# Patient Record
Sex: FEMALE | Race: WHITE | NOT HISPANIC OR LATINO | Employment: UNEMPLOYED | RURAL
[De-identification: names, ages, dates, MRNs, and addresses within clinical notes are randomized per-mention and may not be internally consistent; named-entity substitution may affect disease eponyms.]

---

## 2022-10-14 ENCOUNTER — OFFICE VISIT (OUTPATIENT)
Dept: FAMILY MEDICINE | Facility: CLINIC | Age: 37
End: 2022-10-14
Payer: COMMERCIAL

## 2022-10-14 VITALS
SYSTOLIC BLOOD PRESSURE: 114 MMHG | BODY MASS INDEX: 19.8 KG/M2 | RESPIRATION RATE: 20 BRPM | DIASTOLIC BLOOD PRESSURE: 68 MMHG | WEIGHT: 123.19 LBS | HEIGHT: 66 IN | OXYGEN SATURATION: 98 % | HEART RATE: 83 BPM | TEMPERATURE: 98 F

## 2022-10-14 DIAGNOSIS — L02.411 CUTANEOUS ABSCESS OF RIGHT AXILLA: Primary | ICD-10-CM

## 2022-10-14 PROCEDURE — 99203 PR OFFICE/OUTPT VISIT, NEW, LEVL III, 30-44 MIN: ICD-10-PCS | Mod: 25,,, | Performed by: NURSE PRACTITIONER

## 2022-10-14 PROCEDURE — 1160F RVW MEDS BY RX/DR IN RCRD: CPT | Mod: ,,, | Performed by: NURSE PRACTITIONER

## 2022-10-14 PROCEDURE — 99203 OFFICE O/P NEW LOW 30 MIN: CPT | Mod: 25,,, | Performed by: NURSE PRACTITIONER

## 2022-10-14 PROCEDURE — 3078F PR MOST RECENT DIASTOLIC BLOOD PRESSURE < 80 MM HG: ICD-10-PCS | Mod: ,,, | Performed by: NURSE PRACTITIONER

## 2022-10-14 PROCEDURE — 96372 THER/PROPH/DIAG INJ SC/IM: CPT | Mod: ,,, | Performed by: NURSE PRACTITIONER

## 2022-10-14 PROCEDURE — 1159F PR MEDICATION LIST DOCUMENTED IN MEDICAL RECORD: ICD-10-PCS | Mod: ,,, | Performed by: NURSE PRACTITIONER

## 2022-10-14 PROCEDURE — 3078F DIAST BP <80 MM HG: CPT | Mod: ,,, | Performed by: NURSE PRACTITIONER

## 2022-10-14 PROCEDURE — 3074F SYST BP LT 130 MM HG: CPT | Mod: ,,, | Performed by: NURSE PRACTITIONER

## 2022-10-14 PROCEDURE — 3074F PR MOST RECENT SYSTOLIC BLOOD PRESSURE < 130 MM HG: ICD-10-PCS | Mod: ,,, | Performed by: NURSE PRACTITIONER

## 2022-10-14 PROCEDURE — 1160F PR REVIEW ALL MEDS BY PRESCRIBER/CLIN PHARMACIST DOCUMENTED: ICD-10-PCS | Mod: ,,, | Performed by: NURSE PRACTITIONER

## 2022-10-14 PROCEDURE — 96372 PR INJECTION,THERAP/PROPH/DIAG2ST, IM OR SUBCUT: ICD-10-PCS | Mod: ,,, | Performed by: NURSE PRACTITIONER

## 2022-10-14 PROCEDURE — 1159F MED LIST DOCD IN RCRD: CPT | Mod: ,,, | Performed by: NURSE PRACTITIONER

## 2022-10-14 RX ORDER — CLINDAMYCIN HYDROCHLORIDE 300 MG/1
300 CAPSULE ORAL EVERY 6 HOURS
Qty: 40 CAPSULE | Refills: 0 | Status: SHIPPED | OUTPATIENT
Start: 2022-10-14 | End: 2022-10-24

## 2022-10-14 RX ORDER — MUPIROCIN 20 MG/G
OINTMENT TOPICAL 3 TIMES DAILY
Qty: 30 G | Refills: 0 | Status: SHIPPED | OUTPATIENT
Start: 2022-10-14 | End: 2023-11-28

## 2022-10-14 RX ORDER — LINCOMYCIN HYDROCHLORIDE 300 MG/ML
600 INJECTION, SOLUTION INTRAMUSCULAR; INTRAVENOUS; SUBCONJUNCTIVAL
Status: COMPLETED | OUTPATIENT
Start: 2022-10-14 | End: 2022-10-14

## 2022-10-14 RX ADMIN — LINCOMYCIN HYDROCHLORIDE 600 MG: 300 INJECTION, SOLUTION INTRAMUSCULAR; INTRAVENOUS; SUBCONJUNCTIVAL at 10:10

## 2022-10-14 NOTE — PROGRESS NOTES
"Subjective:       Patient ID: Elisabeth Ford is a 37 y.o. female.    Chief Complaint: Cyst (Pt presents with knot to right axillary x 2 days. She states its very painful. She states that her breast is also "hard" near the knot. The pain radiates into her arm. )    Pt presents with a painful knot under right arm x 2 days.     Review of Systems   Constitutional:  Negative for activity change, appetite change, chills, fatigue and fever.   HENT:  Negative for nasal congestion, ear discharge, nosebleeds, postnasal drip, rhinorrhea, sinus pressure/congestion, sneezing, sore throat and tinnitus.    Eyes:  Negative for pain, discharge, redness and itching.   Respiratory:  Negative for cough, choking, chest tightness, shortness of breath and wheezing.    Cardiovascular:  Negative for chest pain.   Gastrointestinal:  Negative for abdominal distention, abdominal pain, blood in stool, change in bowel habit, constipation, diarrhea, nausea, vomiting and change in bowel habit.   Genitourinary:  Negative for decreased urine volume, dysuria, flank pain, frequency, menstrual irregularity and menstrual problem.        Last cycle 2 weeks ago   Musculoskeletal:  Negative for back pain and gait problem.   Integumentary:  Positive for mole/lesion. Negative for wound, breast mass and breast discharge.   Allergic/Immunologic: Negative for immunocompromised state.   Neurological:  Negative for dizziness, light-headedness and headaches.   Psychiatric/Behavioral:  Negative for agitation, behavioral problems and hallucinations.    Breast: Negative for mass      Objective:      Physical Exam  Vitals and nursing note reviewed.   Constitutional:       Appearance: Normal appearance.   Cardiovascular:      Rate and Rhythm: Normal rate and regular rhythm.      Heart sounds: Normal heart sounds.   Pulmonary:      Effort: Pulmonary effort is normal.      Breath sounds: Normal breath sounds.   Chest:       Musculoskeletal:         General: Normal range of " motion.   Neurological:      Mental Status: She is alert and oriented to person, place, and time.   Psychiatric:         Mood and Affect: Mood normal.         Behavior: Behavior normal.       Assessment:       Problem List Items Addressed This Visit    None  Visit Diagnoses       Cutaneous abscess of right axilla    -  Primary    Relevant Medications    lincomycin injection 600 mg (Start on 10/14/2022 11:00 AM)    clindamycin (CLEOCIN) 300 MG capsule    mupirocin (BACTROBAN) 2 % ointment            Plan:       Warm moist compresses to the area. Clean area with dial soap and water twice daily. Notify clinic if symptoms persist after 1 week and I will order a mammogram and ultrasound.

## 2023-11-28 ENCOUNTER — OFFICE VISIT (OUTPATIENT)
Dept: FAMILY MEDICINE | Facility: CLINIC | Age: 38
End: 2023-11-28
Payer: COMMERCIAL

## 2023-11-28 VITALS
DIASTOLIC BLOOD PRESSURE: 62 MMHG | SYSTOLIC BLOOD PRESSURE: 98 MMHG | TEMPERATURE: 99 F | RESPIRATION RATE: 20 BRPM | WEIGHT: 131 LBS | BODY MASS INDEX: 21.05 KG/M2 | OXYGEN SATURATION: 99 % | HEIGHT: 66 IN | HEART RATE: 72 BPM

## 2023-11-28 DIAGNOSIS — G25.81 RESTLESS LEG SYNDROME: Primary | ICD-10-CM

## 2023-11-28 DIAGNOSIS — F33.9 EPISODE OF RECURRENT MAJOR DEPRESSIVE DISORDER, UNSPECIFIED DEPRESSION EPISODE SEVERITY: ICD-10-CM

## 2023-11-28 DIAGNOSIS — N92.1 MENORRHAGIA WITH IRREGULAR CYCLE: ICD-10-CM

## 2023-11-28 DIAGNOSIS — N94.6 PAINFUL MENSTRUAL PERIODS: ICD-10-CM

## 2023-11-28 PROCEDURE — 3074F SYST BP LT 130 MM HG: CPT | Mod: ,,,

## 2023-11-28 PROCEDURE — 99213 PR OFFICE/OUTPT VISIT, EST, LEVL III, 20-29 MIN: ICD-10-PCS | Mod: ,,,

## 2023-11-28 PROCEDURE — 1159F MED LIST DOCD IN RCRD: CPT | Mod: ,,,

## 2023-11-28 PROCEDURE — 3074F PR MOST RECENT SYSTOLIC BLOOD PRESSURE < 130 MM HG: ICD-10-PCS | Mod: ,,,

## 2023-11-28 PROCEDURE — 3008F PR BODY MASS INDEX (BMI) DOCUMENTED: ICD-10-PCS | Mod: ,,,

## 2023-11-28 PROCEDURE — 3008F BODY MASS INDEX DOCD: CPT | Mod: ,,,

## 2023-11-28 PROCEDURE — 1159F PR MEDICATION LIST DOCUMENTED IN MEDICAL RECORD: ICD-10-PCS | Mod: ,,,

## 2023-11-28 PROCEDURE — 3078F PR MOST RECENT DIASTOLIC BLOOD PRESSURE < 80 MM HG: ICD-10-PCS | Mod: ,,,

## 2023-11-28 PROCEDURE — 3078F DIAST BP <80 MM HG: CPT | Mod: ,,,

## 2023-11-28 PROCEDURE — 1160F PR REVIEW ALL MEDS BY PRESCRIBER/CLIN PHARMACIST DOCUMENTED: ICD-10-PCS | Mod: ,,,

## 2023-11-28 PROCEDURE — 99213 OFFICE O/P EST LOW 20 MIN: CPT | Mod: ,,,

## 2023-11-28 PROCEDURE — 1160F RVW MEDS BY RX/DR IN RCRD: CPT | Mod: ,,,

## 2023-11-28 RX ORDER — POLYETHYLENE GLYCOL AND PROPYLENE GLYCOL 4; 3 MG/ML; MG/ML
SOLUTION/ DROPS OPHTHALMIC
COMMUNITY

## 2023-11-28 RX ORDER — CITALOPRAM 40 MG/1
40 TABLET, FILM COATED ORAL DAILY
COMMUNITY
Start: 2023-07-12 | End: 2023-12-12 | Stop reason: SDUPTHER

## 2023-11-28 RX ORDER — ROPINIROLE 0.5 MG/1
0.5 TABLET, FILM COATED ORAL 2 TIMES DAILY
Qty: 60 TABLET | Refills: 11 | Status: SHIPPED | OUTPATIENT
Start: 2023-11-28 | End: 2023-12-13

## 2023-11-28 NOTE — PROGRESS NOTES
"Subjective     Patient ID: Elisabeth Ford is a 38 y.o. female.    Chief Complaint: Medication Refill (Patient states she is having to take two pills to help)    TIFFANY is a 38 year old female that presents today for complaints of restless leg syndrome at night. She was started on ropinirole 0.25mg once nightly several months ago and states she was having to take 2 tablets for mild symptom relief. She is requesting to increase the medication. She reports not sleeping well at night, and feels more "on edge." She is on celexa 40mg daily. She admits to some depression that is worse on some days than others. Additionally, she is requesting a referral to gynecology for painful and heavy periods. She states her periods last approximately 3 weeks. She reports passing clots. She is unsure but thinks she had a tubal ligation at some time after the birth of a child.    Medication Refill  Associated symptoms include fatigue. Pertinent negatives include no abdominal pain, change in bowel habit, chest pain, chills, coughing, fever, headaches, joint swelling, myalgias, numbness or weakness.     Review of Systems   Constitutional:  Positive for fatigue. Negative for activity change, appetite change, chills and fever.   HENT:  Negative for ear pain, hearing loss, trouble swallowing and voice change.    Eyes:  Negative for visual disturbance.   Respiratory:  Negative for apnea, cough, chest tightness and shortness of breath.    Cardiovascular:  Negative for chest pain, palpitations and leg swelling.   Gastrointestinal:  Negative for abdominal pain, blood in stool, change in bowel habit and reflux.   Genitourinary:  Negative for bladder incontinence, difficulty urinating and flank pain.   Musculoskeletal:  Negative for back pain, gait problem, joint swelling and myalgias.        Restless legs     Integumentary:  Negative for color change and pallor.   Neurological:  Negative for dizziness, weakness, numbness and headaches. "   Psychiatric/Behavioral:  Negative for sleep disturbance. The patient is not nervous/anxious.           Objective     Physical Exam  Vitals and nursing note reviewed.   Constitutional:       Appearance: Normal appearance. She is normal weight.   HENT:      Head: Normocephalic.      Nose: Nose normal.      Mouth/Throat:      Mouth: Mucous membranes are moist.   Eyes:      Extraocular Movements: Extraocular movements intact.      Conjunctiva/sclera: Conjunctivae normal.      Pupils: Pupils are equal, round, and reactive to light.   Cardiovascular:      Rate and Rhythm: Normal rate and regular rhythm.      Pulses: Normal pulses.      Heart sounds: Normal heart sounds.   Pulmonary:      Effort: Pulmonary effort is normal.      Breath sounds: Normal breath sounds.   Abdominal:      General: Abdomen is flat. Bowel sounds are normal.      Palpations: Abdomen is soft.   Musculoskeletal:         General: Normal range of motion.      Cervical back: Normal range of motion and neck supple.   Skin:     General: Skin is warm and dry.   Neurological:      General: No focal deficit present.      Mental Status: She is alert and oriented to person, place, and time.   Psychiatric:         Behavior: Behavior normal.         Thought Content: Thought content normal.        Assessment and Plan     1. Restless leg syndrome  -     rOPINIRole (REQUIP) 0.5 MG tablet; Take 1 tablet (0.5 mg total) by mouth 2 (two) times a day.  Dispense: 60 tablet; Refill: 11    2. Episode of recurrent major depressive disorder, unspecified depression episode severity    3. Menorrhagia with irregular cycle  -     Ambulatory referral/consult to Obstetrics / Gynecology; Future; Expected date: 12/05/2023    4. Painful menstrual periods  -     Ambulatory referral/consult to Obstetrics / Gynecology; Future; Expected date: 12/05/2023        Increase ropinirole, lifestyle changes recommended  Recommend follow up with Raman MOON for elevated depression score in  clinic  Referral to BRISEIDA Tejeda for painful and heavy periods  F/U in 3 months         No follow-ups on file.

## 2023-12-12 ENCOUNTER — OFFICE VISIT (OUTPATIENT)
Dept: BEHAVIORAL HEALTH | Facility: CLINIC | Age: 38
End: 2023-12-12
Payer: COMMERCIAL

## 2023-12-12 VITALS
OXYGEN SATURATION: 98 % | HEART RATE: 91 BPM | BODY MASS INDEX: 20.89 KG/M2 | SYSTOLIC BLOOD PRESSURE: 130 MMHG | WEIGHT: 130 LBS | RESPIRATION RATE: 20 BRPM | TEMPERATURE: 98 F | HEIGHT: 66 IN | DIASTOLIC BLOOD PRESSURE: 70 MMHG

## 2023-12-12 DIAGNOSIS — F33.1 MODERATE EPISODE OF RECURRENT MAJOR DEPRESSIVE DISORDER: Primary | ICD-10-CM

## 2023-12-12 DIAGNOSIS — F51.02 ADJUSTMENT INSOMNIA: ICD-10-CM

## 2023-12-12 DIAGNOSIS — G25.81 RESTLESS LEGS SYNDROME: ICD-10-CM

## 2023-12-12 PROBLEM — F33.9 EPISODE OF RECURRENT MAJOR DEPRESSIVE DISORDER: Status: ACTIVE | Noted: 2023-12-12

## 2023-12-12 PROCEDURE — 3078F DIAST BP <80 MM HG: CPT | Mod: ,,, | Performed by: NURSE PRACTITIONER

## 2023-12-12 PROCEDURE — 90792 PR PSYCHIATRIC DIAGNOSTIC EVALUATION W/MEDICAL SERVICES: ICD-10-PCS | Mod: ,,, | Performed by: NURSE PRACTITIONER

## 2023-12-12 PROCEDURE — 3075F SYST BP GE 130 - 139MM HG: CPT | Mod: ,,, | Performed by: NURSE PRACTITIONER

## 2023-12-12 PROCEDURE — 1159F MED LIST DOCD IN RCRD: CPT | Mod: ,,, | Performed by: NURSE PRACTITIONER

## 2023-12-12 PROCEDURE — 1160F PR REVIEW ALL MEDS BY PRESCRIBER/CLIN PHARMACIST DOCUMENTED: ICD-10-PCS | Mod: ,,, | Performed by: NURSE PRACTITIONER

## 2023-12-12 PROCEDURE — 90792 PSYCH DIAG EVAL W/MED SRVCS: CPT | Mod: ,,, | Performed by: NURSE PRACTITIONER

## 2023-12-12 PROCEDURE — 1160F RVW MEDS BY RX/DR IN RCRD: CPT | Mod: ,,, | Performed by: NURSE PRACTITIONER

## 2023-12-12 PROCEDURE — 3075F PR MOST RECENT SYSTOLIC BLOOD PRESS GE 130-139MM HG: ICD-10-PCS | Mod: ,,, | Performed by: NURSE PRACTITIONER

## 2023-12-12 PROCEDURE — 1159F PR MEDICATION LIST DOCUMENTED IN MEDICAL RECORD: ICD-10-PCS | Mod: ,,, | Performed by: NURSE PRACTITIONER

## 2023-12-12 PROCEDURE — 3078F PR MOST RECENT DIASTOLIC BLOOD PRESSURE < 80 MM HG: ICD-10-PCS | Mod: ,,, | Performed by: NURSE PRACTITIONER

## 2023-12-12 RX ORDER — BUPROPION HYDROCHLORIDE 150 MG/1
150 TABLET ORAL DAILY
Qty: 90 TABLET | Refills: 1 | Status: SHIPPED | OUTPATIENT
Start: 2023-12-12 | End: 2024-06-09

## 2023-12-12 RX ORDER — CLORAZEPATE DIPOTASSIUM 7.5 MG/1
7.5 TABLET ORAL 2 TIMES DAILY PRN
Qty: 60 TABLET | Refills: 0 | Status: SHIPPED | OUTPATIENT
Start: 2023-12-12 | End: 2024-01-11

## 2023-12-12 RX ORDER — PRAMIPEXOLE DIHYDROCHLORIDE 0.12 MG/1
0.12 TABLET ORAL 3 TIMES DAILY
Qty: 90 TABLET | Refills: 2 | Status: SHIPPED | OUTPATIENT
Start: 2023-12-12 | End: 2024-03-11

## 2023-12-12 RX ORDER — CITALOPRAM 40 MG/1
40 TABLET, FILM COATED ORAL DAILY
Qty: 90 TABLET | Refills: 1 | Status: SHIPPED | OUTPATIENT
Start: 2023-12-12 | End: 2024-06-09
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
## 2023-12-13 PROBLEM — F51.02 ADJUSTMENT INSOMNIA: Status: ACTIVE | Noted: 2023-12-13

## 2023-12-13 PROBLEM — G25.81 RESTLESS LEGS SYNDROME: Status: ACTIVE | Noted: 2023-12-13

## 2023-12-19 RX ORDER — METHYLPREDNISOLONE 4 MG/1
TABLET ORAL
Qty: 21 EACH | Refills: 0 | Status: SHIPPED | OUTPATIENT
Start: 2023-12-19 | End: 2024-01-09

## 2023-12-19 RX ORDER — CETIRIZINE HYDROCHLORIDE, PSEUDOEPHEDRINE HYDROCHLORIDE 5; 120 MG/1; MG/1
1 TABLET, FILM COATED, EXTENDED RELEASE ORAL 2 TIMES DAILY PRN
Qty: 20 TABLET | Refills: 0 | Status: SHIPPED | OUTPATIENT
Start: 2023-12-19 | End: 2023-12-29

## 2024-04-29 ENCOUNTER — OFFICE VISIT (OUTPATIENT)
Dept: BEHAVIORAL HEALTH | Facility: CLINIC | Age: 39
End: 2024-04-29
Payer: COMMERCIAL

## 2024-04-29 VITALS
OXYGEN SATURATION: 98 % | SYSTOLIC BLOOD PRESSURE: 110 MMHG | HEART RATE: 86 BPM | WEIGHT: 135 LBS | RESPIRATION RATE: 20 BRPM | BODY MASS INDEX: 21.69 KG/M2 | HEIGHT: 66 IN | TEMPERATURE: 99 F | DIASTOLIC BLOOD PRESSURE: 70 MMHG

## 2024-04-29 DIAGNOSIS — F33.1 MODERATE EPISODE OF RECURRENT MAJOR DEPRESSIVE DISORDER: Primary | ICD-10-CM

## 2024-04-29 DIAGNOSIS — F41.9 ANXIETY: ICD-10-CM

## 2024-04-29 DIAGNOSIS — N92.6 ABNORMAL MENSES: ICD-10-CM

## 2024-04-29 DIAGNOSIS — F51.02 ADJUSTMENT INSOMNIA: ICD-10-CM

## 2024-04-29 LAB

## 2024-04-29 PROCEDURE — 99214 OFFICE O/P EST MOD 30 MIN: CPT | Mod: ,,, | Performed by: NURSE PRACTITIONER

## 2024-04-29 PROCEDURE — 3008F BODY MASS INDEX DOCD: CPT | Mod: CPTII,,, | Performed by: NURSE PRACTITIONER

## 2024-04-29 PROCEDURE — 90833 PSYTX W PT W E/M 30 MIN: CPT | Mod: ,,, | Performed by: NURSE PRACTITIONER

## 2024-04-29 PROCEDURE — 3074F SYST BP LT 130 MM HG: CPT | Mod: CPTII,,, | Performed by: NURSE PRACTITIONER

## 2024-04-29 PROCEDURE — 80305 DRUG TEST PRSMV DIR OPT OBS: CPT | Mod: QW,,, | Performed by: NURSE PRACTITIONER

## 2024-04-29 PROCEDURE — 3078F DIAST BP <80 MM HG: CPT | Mod: CPTII,,, | Performed by: NURSE PRACTITIONER

## 2024-04-29 PROCEDURE — 1159F MED LIST DOCD IN RCRD: CPT | Mod: CPTII,,, | Performed by: NURSE PRACTITIONER

## 2024-04-29 PROCEDURE — 1160F RVW MEDS BY RX/DR IN RCRD: CPT | Mod: CPTII,,, | Performed by: NURSE PRACTITIONER

## 2024-04-29 RX ORDER — ROPINIROLE 0.5 MG/1
0.5 TABLET, FILM COATED ORAL 2 TIMES DAILY
COMMUNITY

## 2024-04-29 RX ORDER — BUPROPION HYDROCHLORIDE 150 MG/1
150 TABLET ORAL DAILY
Qty: 90 TABLET | Refills: 3 | Status: SHIPPED | OUTPATIENT
Start: 2024-04-29 | End: 2025-04-24

## 2024-04-29 RX ORDER — CLORAZEPATE DIPOTASSIUM 7.5 MG/1
7.5 TABLET ORAL 2 TIMES DAILY PRN
Qty: 60 TABLET | Refills: 1 | Status: SHIPPED | OUTPATIENT
Start: 2024-04-29 | End: 2024-06-28

## 2024-04-29 RX ORDER — TRAZODONE HYDROCHLORIDE 100 MG/1
100 TABLET ORAL NIGHTLY PRN
Qty: 90 TABLET | Refills: 0 | Status: SHIPPED | OUTPATIENT
Start: 2024-04-29 | End: 2024-07-28

## 2024-04-29 RX ORDER — CITALOPRAM 40 MG/1
40 TABLET, FILM COATED ORAL DAILY
Qty: 90 TABLET | Refills: 3 | Status: SHIPPED | OUTPATIENT
Start: 2024-04-29 | End: 2025-04-29

## 2024-04-29 NOTE — PROGRESS NOTES
Outpatient Psychiatry Follow-Up Visit (MD/NP)    4/29/2024    Clinical Status of Patient:  Outpatient (Ambulatory)    Chief Complaint:  Elisabeth Ford is a 38 y.o. female who presents today for follow-up of depression, anxiety, adjustment problems, and relational problem.  Met with patient.      Interim Events/Subjective Report/Content of Current Session: Having difficulty sleeping at night. Feels constant depression and anxiety related to the death of her pregnant daughter last year. Is having repeated marital strain due to her 's history of alleged infidelity and his new status of being unemployed. She recently lost custody of her granddaughter to the child's biological father. She is very upset about this as the baby has been with her for the past year. 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 Has had 2-3 panic attacks per month. Feels like depression would improve if things in her life would settle down. Father in law is also having issues with health problems and given that he lives in close proximity to them, this is a concern of hers. Missed a paycheck due to an error in accounting which has caused her significant stress.    Psychotherapy:  Target symptoms: recurrent depression, anxiety , adjustment, work stress  Why chosen therapy is appropriate versus another modality: relevant to diagnosis, patient responds to this modality  Outcome monitoring methods: self-report, observation, checklist/rating scale  Therapeutic intervention type: supportive psychotherapy  Topics discussed/themes: relationships difficulties, work stress, parenting issues, difficulty managing affect in interpersonal relationships, building skills sets for symptom management, symptom recognition  The patient's response to the intervention is accepting. The patient's progress toward treatment goals is good.   Duration of intervention: 23 minutes.    Patient  reviewed this visit.     Review  of Systems   PSYCHIATRIC: Pertinant items are noted in the narrative.  CONSTITUTIONAL: No weight gain or loss.   RESPIRATORY: No shortness of breath.  CARDIOVASCULAR: No tachycardia or chest pain.    Past Medical, Family and Social History: The patient's past medical, family and social history have been reviewed and updated as appropriate within the electronic medical record - see encounter notes.    Compliance: yes    Side effects: None    Risk Parameters:  Patient reports no suicidal ideation  Patient reports no homicidal ideation  Patient reports no self-injurious behavior  Patient reports no violent behavior    Exam (detailed: at least 9 elements; comprehensive: all 15 elements)         4/29/2024    11:34 AM   Adult ADHD Self-Report Scale   How often do you have trouble wrapping up the final details of a project once the chanllenging parts have been done? 2   How often do you have difficulty getting things in order when you have to do a task that requires organization? 2   How often do you have problems remembering appointments or obligations? 3   When you have a task that requires a lot of thought, how often do you avoid or delay getting started? 2   How often do you fidget or squirm with your hands or feet when you have to sit down for a long time? 4   How often do you feel overly active and compelled to do things, like you were driven by a motor? 2   Part A Score 15   How often do you make careless mistakes when you have to work on a boring or difficult project? 1   How often do you have difficulty keeping your attention when you are doing boring or repetitive work? 2   How often do you have difficulty concentrating on what people say to you, even when they are speaking to you directly? 4   How often do you misplace or have difficulty finding things at home or at work? 2   How often are you distracted by activity or noise around you? 2   How often do you leave your seat in meetings or other situations in  which you are expected to remain seated? 1   How often do you feel restless or fidgety? 4   How often do you have difficulty unwinding and relaxing when you have time to yourself? 3   How often do you find yourself talking too much when you are in social situations? 2   When you're in a conversation, how often do you find yourself finishing the sentences of the people you are talking to before they can finish them themselves? 2   How often do you have difficulty waiting your turn in situations when turn taking is required? 1   How often do you interrupt others when they are busy? 1   Part B Score 25         4/29/2024    11:30 AM   MDQ Scale   you felt so good or so hyper that other people thought you were not your normal self or you were so hyper that you got into trouble? 0   you were so irritable that you shouted at people or started fights or arguments? 1   you felt much more self-confident than usual? 0   you got much less sleep than usual and found that you didn't really miss it? 1   you were more talkative or spoke much faster than usual? 1   thoughts raced through your head or you couldn't slow your mind down? 1   you were so easily distracted by things around you that you had trouble concentrating or staying on track? 1   you had more energy than usual? 0   you were much more active or did many more things than usual? 0   you were much more social or outgoing than usual, for example, you telephoned friends in the middle of the night? 0   you were much more interested in sex than usual? 0   you did things that were unusual for you or that other people might have thought were excessive, foolish, or risky? 0   spending money got you or your family in trouble? 0   If you checked YES to more than one of the above, have several of these ever happened during the same period of time? 0   How much of a problem did any of these cause you - like being unable to work; having family, money or legal troubles; getting into  "arguments or fights? Minor problem   Mood Disorder Questionnaire Score  5     Constitutional  Vitals:  Most recent vital signs, dated greater than 90 days prior to this appointment, were reviewed.   Vitals:    04/29/24 1120   BP: 110/70   Pulse: 86   Resp: 20   Temp: 98.6 °F (37 °C)   SpO2: 98%   Weight: 61.2 kg (135 lb)   Height: 5' 6" (1.676 m)        General:  age appropriate, well nourished, older than stated age, casually dressed, neatly groomed     Musculoskeletal  Muscle Strength/Tone:  no spasicity, no rigidity, no cogwheeling, no flaccidity, no paratonia, no dyskinesia, no dystonia   Gait & Station:  non-ataxic     Psychiatric  Speech:  no latency; no press   Mood & Affect:  anxious, depressed  congruent and appropriate   Thought Process:  normal and logical   Associations:  intact   Thought Content:  normal, no suicidality, no homicidality, delusions, or paranoia   Insight:  intact, has awareness of illness   Judgement: behavior is adequate to circumstances   Orientation:  grossly intact   Memory: intact for content of interview   Language: grossly intact   Attention Span & Concentration:  able to focus   Fund of Knowledge:  intact and appropriate to age and level of education, familiar with aspects of current personal life     Assessment and Diagnosis   Status/Progress: Based on the examination today, the patient's problem(s) is/are improved and adequately but not ideally controlled.  New problems have not been presented today.   Co-morbidities, Diagnostic uncertainty, and Lack of compliance are not complicating management of the primary condition.  There are no active rule-out diagnoses for this patient at this time.     General Impression: Has had little improvement in symptoms and attributes this to worsening of personal life. Will begin Trazodone to help with night time insomnia, continue clorazepate and increase Wellbutrin to 300 mg XL daily. Would benefit from individual counseling. Passage of time " will help with depression and anxiety symptoms related to daughters passing, issues with  and granddaughter. Will rerefer to [facility] for routine GYN examination and E&M for abnormal menses (2 periods this month).      ICD-10-CM ICD-9-CM   1. Moderate episode of recurrent major depressive disorder  F33.1 296.32   2. Adjustment insomnia  F51.02 307.41   3. Anxiety  F41.9 300.00   4. Abnormal menses  N92.6 626.9       Intervention/Counseling/Treatment Plan   Medication Management: Continue current medications. The risks and benefits of medication were discussed with the patient. Verbalized understanding  Counseling provided with patient as follows: importance of compliance with chosen treatment options was emphasized, risks and benefits of treatment options, including medications, were discussed with the patient, risk factor reduction, prognosis, patient education, instructions for  management, treatment, and follow-up were reviewed  POCT UDS presumptive collected and is appropriate.  Needs a referral back to OBGYN.    Medications:   Medication List with Changes/Refills   New Medications    CITALOPRAM (CELEXA) 40 MG TABLET    Take 1 tablet (40 mg total) by mouth once daily.       Start Date: 4/29/2024 End Date: 4/29/2025    TRAZODONE (DESYREL) 100 MG TABLET    Take 1 tablet (100 mg total) by mouth nightly as needed for Insomnia.       Start Date: 4/29/2024 End Date: 7/28/2024   Current Medications    CITALOPRAM (CELEXA) 40 MG TABLET    Take 1 tablet (40 mg total) by mouth once daily.       Start Date: 12/12/2023End Date: 6/9/2024    -PROPYLENE GLYCOL, PF, (SYSTANE, PF,) 0.4-0.3 % DPET    Apply to eye.       Start Date: --        End Date: --    PRAMIPEXOLE (MIRAPEX) 0.125 MG TABLET    Take 1 tablet (0.125 mg total) by mouth 3 (three) times daily.       Start Date: 12/12/2023End Date: 4/29/2024    ROPINIROLE (REQUIP) 0.5 MG TABLET    Take 0.5 mg by mouth 2 (two) times a day.       Start Date: --         End Date: --   Changed and/or Refilled Medications    Modified Medication Previous Medication    BUPROPION (WELLBUTRIN XL) 150 MG TB24 TABLET buPROPion (WELLBUTRIN XL) 150 MG TB24 tablet       Take 1 tablet (150 mg total) by mouth once daily.    Take 1 tablet (150 mg total) by mouth once daily.       Start Date: 4/29/2024 End Date: 4/24/2025    Start Date: 12/12/2023End Date: 4/29/2024    CLORAZEPATE (TRANXENE) 7.5 MG TAB clorazepate (TRANXENE) 7.5 MG Tab       Take 1 tablet (7.5 mg total) by mouth 2 (two) times daily as needed (anxiety).    Take 1 tablet (7.5 mg total) by mouth 2 (two) times daily as needed (anxiety).       Start Date: 4/29/2024 End Date: 6/28/2024    Start Date: 12/12/2023End Date: 4/29/2024     Return to Clinic: 3 months    Patient instructed to please go to emergency department if feeling as though you are going to harm to yourself or others or if you are in crisis; or to please call the clinic to report any worsening of symptoms or problems associated with medication.     Total Time: 51 minutes

## 2024-07-11 ENCOUNTER — TELEPHONE (OUTPATIENT)
Dept: OBSTETRICS AND GYNECOLOGY | Facility: CLINIC | Age: 39
End: 2024-07-11
Payer: COMMERCIAL

## 2024-07-11 NOTE — TELEPHONE ENCOUNTER
----- Message from Kimberly Quiroz sent at 7/9/2024 10:58 AM CDT -----  Who Called: Elisabeth Ford    Caller is requesting assistance/information from provider's office.          Preferred Method of Contact: Phone Call  Patient's Preferred Phone Number on File: [phone]   Best Call Back Number, if different:  Additional Information: pt calling to reschedule 7/8 appt she missed - schedule will not allow me to schedule    Patient was not available, unable to LVM, if patient calls back please assist in rescheduling next available.

## 2024-07-31 ENCOUNTER — OFFICE VISIT (OUTPATIENT)
Dept: OBSTETRICS AND GYNECOLOGY | Facility: CLINIC | Age: 39
End: 2024-07-31

## 2024-07-31 VITALS
OXYGEN SATURATION: 97 % | HEIGHT: 66 IN | DIASTOLIC BLOOD PRESSURE: 56 MMHG | SYSTOLIC BLOOD PRESSURE: 100 MMHG | RESPIRATION RATE: 18 BRPM | BODY MASS INDEX: 23.17 KG/M2 | HEART RATE: 87 BPM | WEIGHT: 144.19 LBS

## 2024-07-31 DIAGNOSIS — N92.1 MENORRHAGIA WITH IRREGULAR CYCLE: ICD-10-CM

## 2024-07-31 DIAGNOSIS — Z78.0 MENOPAUSE: ICD-10-CM

## 2024-07-31 DIAGNOSIS — N89.8 VAGINAL DISCHARGE: ICD-10-CM

## 2024-07-31 DIAGNOSIS — G89.29 CHRONIC RIGHT LOWER QUADRANT PAIN: ICD-10-CM

## 2024-07-31 DIAGNOSIS — N94.19 DYSPAREUNIA DUE TO MEDICAL CONDITION IN FEMALE: ICD-10-CM

## 2024-07-31 DIAGNOSIS — N76.0 BACTERIAL VAGINOSIS: ICD-10-CM

## 2024-07-31 DIAGNOSIS — B96.89 BACTERIAL VAGINOSIS: ICD-10-CM

## 2024-07-31 DIAGNOSIS — N92.6 IRREGULAR MENSES: Primary | ICD-10-CM

## 2024-07-31 DIAGNOSIS — R10.31 CHRONIC RIGHT LOWER QUADRANT PAIN: ICD-10-CM

## 2024-07-31 DIAGNOSIS — N94.6 DYSMENORRHEA: ICD-10-CM

## 2024-07-31 DIAGNOSIS — R10.2 PELVIC PAIN: ICD-10-CM

## 2024-07-31 LAB
CTP QC/QA: YES
FECAL OCCULT BLOOD, POC: NEGATIVE

## 2024-07-31 PROCEDURE — 99459 PELVIC EXAMINATION: CPT | Mod: PBBFAC | Performed by: OBSTETRICS & GYNECOLOGY

## 2024-07-31 PROCEDURE — 88142 CYTOPATH C/V THIN LAYER: CPT | Mod: TC,GCY | Performed by: OBSTETRICS & GYNECOLOGY

## 2024-07-31 PROCEDURE — 99214 OFFICE O/P EST MOD 30 MIN: CPT | Mod: S$PBB,,, | Performed by: OBSTETRICS & GYNECOLOGY

## 2024-07-31 PROCEDURE — 99999PBSHW PR PBB SHADOW TECHNICAL ONLY FILED TO HB: Mod: PBBFAC,,,

## 2024-07-31 PROCEDURE — 87660 TRICHOMONAS VAGIN DIR PROBE: CPT | Mod: ,,, | Performed by: CLINICAL MEDICAL LABORATORY

## 2024-07-31 PROCEDURE — 99215 OFFICE O/P EST HI 40 MIN: CPT | Mod: PBBFAC | Performed by: OBSTETRICS & GYNECOLOGY

## 2024-07-31 PROCEDURE — 99999 PR PBB SHADOW E&M-EST. PATIENT-LVL V: CPT | Mod: PBBFAC,,, | Performed by: OBSTETRICS & GYNECOLOGY

## 2024-07-31 PROCEDURE — 87510 GARDNER VAG DNA DIR PROBE: CPT | Mod: ,,, | Performed by: CLINICAL MEDICAL LABORATORY

## 2024-07-31 PROCEDURE — 82270 OCCULT BLOOD FECES: CPT | Mod: PBBFAC | Performed by: OBSTETRICS & GYNECOLOGY

## 2024-07-31 PROCEDURE — 81001 URINALYSIS AUTO W/SCOPE: CPT | Mod: ,,, | Performed by: CLINICAL MEDICAL LABORATORY

## 2024-07-31 PROCEDURE — 99999PBSHW POCT OCCULT BLOOD STOOL: Mod: PBBFAC,,,

## 2024-07-31 PROCEDURE — 99459 PELVIC EXAMINATION: CPT | Mod: S$PBB,,, | Performed by: OBSTETRICS & GYNECOLOGY

## 2024-07-31 PROCEDURE — 87480 CANDIDA DNA DIR PROBE: CPT | Mod: ,,, | Performed by: CLINICAL MEDICAL LABORATORY

## 2024-07-31 NOTE — PATIENT INSTRUCTIONS
Dr. Mac Carranza thanks you for this office visit at [facility].    Diagnosis for this visit:   Problem List Items Addressed This Visit    None  Visit Diagnoses       Irregular menses    -  Primary    Relevant Orders    ThinPrep Pap Test    US Pelvis Complete Non OB    CBC Auto Differential    Comprehensive Metabolic Panel    Estradiol    Follicle Stimulating Hormone    Luteinizing Hormone    Sedimentation Rate    Thyroid Panel    Vitamin D    Urinalysis, Reflex to Urine Culture    Hemoglobin A1C    Menorrhagia with irregular cycle        Relevant Orders    ThinPrep Pap Test    US Pelvis Complete Non OB    CBC Auto Differential    Comprehensive Metabolic Panel    Estradiol    Follicle Stimulating Hormone    Luteinizing Hormone    Sedimentation Rate    Thyroid Panel    Vitamin D    Urinalysis, Reflex to Urine Culture    Hemoglobin A1C    Dysmenorrhea        Relevant Orders    ThinPrep Pap Test    US Pelvis Complete Non OB    CBC Auto Differential    Comprehensive Metabolic Panel    Estradiol    Follicle Stimulating Hormone    Luteinizing Hormone    Sedimentation Rate    Thyroid Panel    Vitamin D    Urinalysis, Reflex to Urine Culture    Hemoglobin A1C    Dyspareunia due to medical condition in female        Relevant Orders    ThinPrep Pap Test    US Pelvis Complete Non OB    CBC Auto Differential    Comprehensive Metabolic Panel    Estradiol    Follicle Stimulating Hormone    Luteinizing Hormone    Sedimentation Rate    Thyroid Panel    Vitamin D    Urinalysis, Reflex to Urine Culture    Hemoglobin A1C    Chronic right lower quadrant pain        Relevant Orders    ThinPrep Pap Test    US Pelvis Complete Non OB    CBC Auto Differential    Comprehensive Metabolic Panel    Estradiol    Follicle Stimulating Hormone    Luteinizing Hormone    Sedimentation Rate    Thyroid Panel    Vitamin D    Urinalysis, Reflex to Urine Culture    Hemoglobin A1C    Pelvic pain        Relevant Orders    ThinPrep Pap Test    US  Pelvis Complete Non OB    CBC Auto Differential    Comprehensive Metabolic Panel    Estradiol    Follicle Stimulating Hormone    Luteinizing Hormone    Sedimentation Rate    Thyroid Panel    Vitamin D    Urinalysis, Reflex to Urine Culture    Hemoglobin A1C    Vaginal discharge        Relevant Orders    ThinPrep Pap Test    US Pelvis Complete Non OB    CBC Auto Differential    Comprehensive Metabolic Panel    Estradiol    Follicle Stimulating Hormone    Luteinizing Hormone    Sedimentation Rate    Thyroid Panel    Vitamin D    Urinalysis, Reflex to Urine Culture    Hemoglobin A1C    Bacterial Vaginosis    Menopause        ?    Relevant Orders    ThinPrep Pap Test    US Pelvis Complete Non OB    CBC Auto Differential    Comprehensive Metabolic Panel    Estradiol    Follicle Stimulating Hormone    Luteinizing Hormone    Sedimentation Rate    Thyroid Panel    Vitamin D    Urinalysis, Reflex to Urine Culture    Hemoglobin A1C             The Plan:  Thin prep Pap; affirm study; screening labs; sed rate; urinalysis reflex urine; gonadotropins and estradiol level             Schedule for pelvic ultrasound             Doctor Tere recommends vitamin D3 over-the-counter at 4000 units daily. -she currently is on no vitamin-D supplementation.              Follow-up in 3-4 weeks        Please practice best food and exercise habits for your age.    Dr. Mac Carranza recommends avoidance of smoking and illicit medications or habits.    Please call  or schedule for any change in your health.     Please keep the next scheduled appointment or call for any need to change the appointment.     Dr. Mac Carranza recommends yearly exams for good health maintenance.      Thank you    Dr. Mac Carranza  07/31/2024 5:04 PM

## 2024-07-31 NOTE — PROGRESS NOTES
Elisabeth Ford female  for   Chief Complaint   Patient presents with    irregular cycles      Cycles can go for 1 week or sometimes 2 weeks, with right side pain that gets in the way of her daily actitives. Previously 5 cysts have been found on her ovaries the pain sometimes radiates down her leg.     Annual Exam     Patient is also here for an annual exam         OB History    No obstetric history on file.          HPI:  Patient was new to my practice.  She is 39 years of age  5, para 4 with 1 ectopic pregnancy on the right side.  Patient was been chronically complaining of heavy irregular menstrual cycles with the associated increasing painful periods -dysmenorrhea.  She complains of dyspareunia with referred pain to the right lower quadrant.    Patient is requesting hysterectomy resolve her chronic problems.    Her last menstrual period was 2024.  Menses was 4 days.  Patient wears tampons for control.  Patient reports flooding through tampons.  She can not wear perineal pads due to allergy the perineal pads.    She does complain of some intermittent hot flash symptomatology.    Patient believes she had laparoscopy for the ectopic pregnancy on the right.  Patient believes procedure was performed by Dr. Elias Hernandez at [facility].  Subsequently to the ectopic pregnancy resection, she did have 2 additional children.  All 4 children were delivered by  section.    Patient reports that Dr. Elias Hernandez delivered all her children.  Patient's youngest child is 17 years of age and the patient has had subsequent to her last  section no contraception.  Patient believes that Dr. Hernandez did not perform a tubal sterilization with the last  section.    Patient denies any galactorrhea.  She was complain of mastodynia.  Patient has never had a mammogram.  Patient has a negative history of breast carcinoma in the family.     History reviewed. No pertinent past medical history.   Past  "Surgical History:   Procedure Laterality Date     SECTION        Review of patient's allergies indicates:   Allergen Reactions    Phenergan [promethazine] Swelling        Review of Systems:Pertinent items are noted in HPI.     Physical exam:This gyn related exam was chaperoned by a female medical assistant.      BP (!) 100/56 (BP Location: Right arm, Patient Position: Sitting)   Pulse 87   Resp 18   Ht 5' 6" (1.676 m)   Wt 65.4 kg (144 lb 3.2 oz)   SpO2 97%   BMI 23.27 kg/m²      General Appearance: healthy, alert, smiling, slender  female with a BMI of 23.27    HEENT: Normal exam    Lymphatic: no palpable lymphadenopathy, no hepatosplenomegaly    Chest:           Breasts:No dimpling, nipple retraction or discharge. No masses or nodes., Normal to inspection, Normal breast tissue bilaterally, bilateral fibrocystic changes, and tender breasts bilaterally in the upper outer quadrants; patient has no obvious supraclavicular lymphadenopathy or axillary lymphadenopathy.           Lungs:clear to auscultation bilaterally           Heart: regular rate and rhythm, S1, S2 normal, no murmur, click, rub or gallop    Abdomen: soft, non-tender, without masses or organomegaly, normal bowel sounds, without guarding, without rebound, and multiple transverse Pfannenstiel incisions that are well healed on the lower abdomen with no evidence of ventral incisional hernia; patient has no abdominal bruit.  Slight tenderness to deep palpation right lower quadrant.    Pelvic:                    Vulva: normal appearing vulva with no masses, tenderness or lesions, shaved                    Cervix: normal appearing cervix without discharge or lesions, nulliparous os                    Uterus: uterus is normal size, shape, consistency and nontender, anteverted, mobile, moderately tender examination                    Annexa(e): normal adnexa in size, nontender and no masses                   Rectal: normal rectal, no " masses, guaiac negative stool obtained.     Extremity: normal, extremities warm, no clubbing, no cyanosis, no edema, non-tender    Skin: normal exam    Psych and neurologic exam: WNL                Assessment:   Problem List Items Addressed This Visit    None  Visit Diagnoses       Irregular menses    -  Primary    Relevant Orders    ThinPrep Pap Test    US Pelvis Complete Non OB    CBC Auto Differential    Comprehensive Metabolic Panel    Estradiol    Follicle Stimulating Hormone    Luteinizing Hormone    Sedimentation Rate    Thyroid Panel    Vitamin D    Urinalysis, Reflex to Urine Culture (Completed)    Hemoglobin A1C    POCT occult blood stool (Completed)    Urinalysis, Microscopic (Completed)    Menorrhagia with irregular cycle        Relevant Orders    ThinPrep Pap Test    US Pelvis Complete Non OB    CBC Auto Differential    Comprehensive Metabolic Panel    Estradiol    Follicle Stimulating Hormone    Luteinizing Hormone    Sedimentation Rate    Thyroid Panel    Vitamin D    Urinalysis, Reflex to Urine Culture (Completed)    Hemoglobin A1C    POCT occult blood stool (Completed)    Urinalysis, Microscopic (Completed)    Dysmenorrhea        Relevant Orders    ThinPrep Pap Test    US Pelvis Complete Non OB    CBC Auto Differential    Comprehensive Metabolic Panel    Estradiol    Follicle Stimulating Hormone    Luteinizing Hormone    Sedimentation Rate    Thyroid Panel    Vitamin D    Urinalysis, Reflex to Urine Culture (Completed)    Hemoglobin A1C    POCT occult blood stool (Completed)    Urinalysis, Microscopic (Completed)    Dyspareunia due to medical condition in female        Relevant Orders    ThinPrep Pap Test    US Pelvis Complete Non OB    CBC Auto Differential    Comprehensive Metabolic Panel    Estradiol    Follicle Stimulating Hormone    Luteinizing Hormone    Sedimentation Rate    Thyroid Panel    Vitamin D    Urinalysis, Reflex to Urine Culture (Completed)    Hemoglobin A1C    POCT occult blood  stool (Completed)    Urinalysis, Microscopic (Completed)    Chronic right lower quadrant pain        Relevant Orders    ThinPrep Pap Test    US Pelvis Complete Non OB    CBC Auto Differential    Comprehensive Metabolic Panel    Estradiol    Follicle Stimulating Hormone    Luteinizing Hormone    Sedimentation Rate    Thyroid Panel    Vitamin D    Urinalysis, Reflex to Urine Culture (Completed)    Hemoglobin A1C    POCT occult blood stool (Completed)    Urinalysis, Microscopic (Completed)    Pelvic pain        Relevant Orders    ThinPrep Pap Test    US Pelvis Complete Non OB    CBC Auto Differential    Comprehensive Metabolic Panel    Estradiol    Follicle Stimulating Hormone    Luteinizing Hormone    Sedimentation Rate    Thyroid Panel    Vitamin D    Urinalysis, Reflex to Urine Culture (Completed)    Hemoglobin A1C    POCT occult blood stool (Completed)    Urinalysis, Microscopic (Completed)    Vaginal discharge        Relevant Orders    ThinPrep Pap Test    US Pelvis Complete Non OB    CBC Auto Differential    Comprehensive Metabolic Panel    Estradiol    Follicle Stimulating Hormone    Luteinizing Hormone    Sedimentation Rate    Thyroid Panel    Vitamin D    Urinalysis, Reflex to Urine Culture (Completed)    Hemoglobin A1C    Bacterial Vaginosis (Completed)    POCT occult blood stool (Completed)    Urinalysis, Microscopic (Completed)    Menopause        ?    Relevant Orders    ThinPrep Pap Test    US Pelvis Complete Non OB    CBC Auto Differential    Comprehensive Metabolic Panel    Estradiol    Follicle Stimulating Hormone    Luteinizing Hormone    Sedimentation Rate    Thyroid Panel    Vitamin D    Urinalysis, Reflex to Urine Culture (Completed)    Hemoglobin A1C    POCT occult blood stool (Completed)    Urinalysis, Microscopic (Completed)    Bacterial vaginosis        Confirmed by affirm study             Plan:  Thin prep Pap; affirm study; screening labs; sed rate; urinalysis reflex urine; gonadotropins and  estradiol level             Schedule for pelvic ultrasound             Doctor Tere recommends vitamin D3 over-the-counter at 4000 units daily. -she currently is on no vitamin-D supplementation.              Follow-up in 3-4 weeks             Addendum on 08/02/2024:  Patient was found have bacterial vaginosis study.  The patient be placed on Flagyl therapy.  Component Ref Range & Units 2 d ago   Candida Species Negative, Invalid Negative   Gardnerella Negative, Invalid Positive Abnormal    Trichomonas Negative, Invalid Negative   Resulting Agency  [facility]

## 2024-08-01 LAB
BILIRUB UR QL STRIP: NEGATIVE
CANDIDA SPECIES: NEGATIVE
CLARITY UR: CLEAR
COLOR UR: NORMAL
GARDNERELLA: POSITIVE
GLUCOSE UR STRIP-MCNC: NORMAL MG/DL
KETONES UR STRIP-SCNC: NEGATIVE MG/DL
LEUKOCYTE ESTERASE UR QL STRIP: NEGATIVE
NITRITE UR QL STRIP: NEGATIVE
PH UR STRIP: 7 PH UNITS
PROT UR QL STRIP: NEGATIVE
RBC # UR STRIP: NEGATIVE /UL
RBC #/AREA URNS HPF: <1 /HPF
SP GR UR STRIP: 1.01
SQUAMOUS #/AREA URNS LPF: ABNORMAL /HPF
TRICHOMONAS: NEGATIVE
UROBILINOGEN UR STRIP-ACNC: NORMAL MG/DL
WBC #/AREA URNS HPF: 2 /HPF

## 2024-08-02 ENCOUNTER — PATIENT MESSAGE (OUTPATIENT)
Dept: OBSTETRICS AND GYNECOLOGY | Facility: CLINIC | Age: 39
End: 2024-08-02

## 2024-08-02 LAB
GH SERPL-MCNC: NORMAL NG/ML
INSULIN SERPL-ACNC: NORMAL U[IU]/ML
LAB AP CLINICAL INFORMATION: NORMAL
LAB AP GYN INTERPRETATION: NEGATIVE
LAB AP PAP DISCLAIMER COMMENTS: NORMAL
RENIN PLAS-CCNC: NORMAL NG/ML/H

## 2024-08-02 RX ORDER — METRONIDAZOLE 500 MG/1
500 TABLET ORAL 3 TIMES DAILY
Qty: 42 TABLET | Refills: 0 | Status: SHIPPED | OUTPATIENT
Start: 2024-08-02 | End: 2024-08-16

## 2024-08-16 DIAGNOSIS — F33.1 MODERATE EPISODE OF RECURRENT MAJOR DEPRESSIVE DISORDER: ICD-10-CM

## 2024-08-19 RX ORDER — CITALOPRAM 40 MG/1
40 TABLET, FILM COATED ORAL DAILY
Qty: 90 TABLET | Refills: 3 | Status: SHIPPED | OUTPATIENT
Start: 2024-08-19 | End: 2025-08-19

## 2024-08-23 ENCOUNTER — HOSPITAL ENCOUNTER (OUTPATIENT)
Dept: RADIOLOGY | Facility: HOSPITAL | Age: 39
Discharge: HOME | End: 2024-08-23
Attending: OBSTETRICS & GYNECOLOGY

## 2024-08-23 DIAGNOSIS — N92.1 MENORRHAGIA WITH IRREGULAR CYCLE: ICD-10-CM

## 2024-08-23 DIAGNOSIS — N89.8 VAGINAL DISCHARGE: ICD-10-CM

## 2024-08-23 DIAGNOSIS — N94.6 DYSMENORRHEA: ICD-10-CM

## 2024-08-23 DIAGNOSIS — N94.19 DYSPAREUNIA DUE TO MEDICAL CONDITION IN FEMALE: ICD-10-CM

## 2024-08-23 DIAGNOSIS — N92.6 IRREGULAR MENSES: ICD-10-CM

## 2024-08-23 DIAGNOSIS — G89.29 CHRONIC RIGHT LOWER QUADRANT PAIN: ICD-10-CM

## 2024-08-23 DIAGNOSIS — R10.2 PELVIC PAIN: ICD-10-CM

## 2024-08-23 DIAGNOSIS — Z78.0 MENOPAUSE: ICD-10-CM

## 2024-08-23 DIAGNOSIS — R10.31 CHRONIC RIGHT LOWER QUADRANT PAIN: ICD-10-CM

## 2024-10-10 ENCOUNTER — TELEPHONE (OUTPATIENT)
Dept: OBSTETRICS AND GYNECOLOGY | Facility: CLINIC | Age: 39
End: 2024-10-10

## 2024-10-10 NOTE — TELEPHONE ENCOUNTER
----- Message from Nurse Chambers sent at 10/10/2024  3:02 PM CDT -----  Regarding: appt  You can call her and get her set up to see  or MIGDALIA Damon  ----- Message -----  From: Daniella Snell MA  Sent: 10/9/2024   3:04 PM CDT  To: [facility] Staff    Who Called: Elisabeth Ford    Caller is requesting assistance/information from provider's office.        Preferred Method of Contact: Phone Call  Patient's Preferred Phone Number on File: [phone]   Best Call Back Number, if different:  Additional Information: pt states at last visit that Dr. Carranza was supposed to set her up with a different dr for hysterectomy

## 2024-10-21 ENCOUNTER — TELEPHONE (OUTPATIENT)
Dept: OBSTETRICS AND GYNECOLOGY | Facility: CLINIC | Age: 39
End: 2024-10-21

## 2024-10-21 ENCOUNTER — PATIENT MESSAGE (OUTPATIENT)
Dept: OBSTETRICS AND GYNECOLOGY | Facility: CLINIC | Age: 39
End: 2024-10-21

## 2024-10-21 ENCOUNTER — OFFICE VISIT (OUTPATIENT)
Dept: OBSTETRICS AND GYNECOLOGY | Facility: CLINIC | Age: 39
End: 2024-10-21

## 2024-10-21 VITALS
HEART RATE: 77 BPM | HEIGHT: 66 IN | BODY MASS INDEX: 23.49 KG/M2 | OXYGEN SATURATION: 97 % | WEIGHT: 146.19 LBS | DIASTOLIC BLOOD PRESSURE: 59 MMHG | SYSTOLIC BLOOD PRESSURE: 112 MMHG

## 2024-10-21 DIAGNOSIS — N92.0 MENORRHAGIA WITH REGULAR CYCLE: ICD-10-CM

## 2024-10-21 DIAGNOSIS — R10.2 PELVIC PAIN: ICD-10-CM

## 2024-10-21 DIAGNOSIS — N89.8 VAGINAL DISCHARGE: ICD-10-CM

## 2024-10-21 DIAGNOSIS — N92.6 IRREGULAR MENSES: Primary | ICD-10-CM

## 2024-10-21 DIAGNOSIS — N94.6 DYSMENORRHEA: ICD-10-CM

## 2024-10-21 PROCEDURE — 87510 GARDNER VAG DNA DIR PROBE: CPT | Mod: ,,, | Performed by: CLINICAL MEDICAL LABORATORY

## 2024-10-21 PROCEDURE — 99214 OFFICE O/P EST MOD 30 MIN: CPT | Mod: PBBFAC | Performed by: OBSTETRICS & GYNECOLOGY

## 2024-10-21 PROCEDURE — 99999 PR PBB SHADOW E&M-EST. PATIENT-LVL IV: CPT | Mod: PBBFAC,,, | Performed by: OBSTETRICS & GYNECOLOGY

## 2024-10-21 PROCEDURE — 87480 CANDIDA DNA DIR PROBE: CPT | Mod: ,,, | Performed by: CLINICAL MEDICAL LABORATORY

## 2024-10-21 PROCEDURE — 87660 TRICHOMONAS VAGIN DIR PROBE: CPT | Mod: ,,, | Performed by: CLINICAL MEDICAL LABORATORY

## 2024-10-21 PROCEDURE — 99214 OFFICE O/P EST MOD 30 MIN: CPT | Mod: S$PBB,,, | Performed by: OBSTETRICS & GYNECOLOGY

## 2024-10-21 RX ORDER — HYDROCODONE BITARTRATE AND ACETAMINOPHEN 5; 325 MG/1; MG/1
1 TABLET ORAL EVERY 6 HOURS PRN
Qty: 18 TABLET | Refills: 0 | Status: SHIPPED | OUTPATIENT
Start: 2024-10-21

## 2024-10-21 RX ORDER — TRANEXAMIC ACID 650 MG/1
1300 TABLET ORAL 3 TIMES DAILY
Qty: 30 TABLET | Refills: 11 | Status: SHIPPED | OUTPATIENT
Start: 2024-10-21 | End: 2024-10-26

## 2024-10-21 RX ORDER — MISOPROSTOL 200 UG/1
400 TABLET ORAL ONCE
Qty: 2 TABLET | Refills: 0 | Status: SHIPPED | OUTPATIENT
Start: 2024-10-21 | End: 2024-11-06 | Stop reason: SDUPTHER

## 2024-10-21 NOTE — PROGRESS NOTES
Gynecology History and Physical    Assessment/Plan:   Problem List Items Addressed This Visit    None  Visit Diagnoses       Irregular menses    -  Primary    Dysmenorrhea        Relevant Medications    HYDROcodone-acetaminophen (NORCO) 5-325 mg per tablet    Other Relevant Orders    US Pelvis Complete Non OB    Vaginal discharge        Relevant Orders    Bacterial Vaginosis (Completed)    Menorrhagia with regular cycle        Relevant Orders    US Pelvis Complete Non OB    Endometrial biopsy          She desires definitive surgical management via a hysterectomy ASAP.   Due to having no US report, RTC in 2 weeks for pelvic US and return GYN visit with [facility]. She is up to date on her PAP.     To schedule TRH BS with cysto on 2024 at 0730.   Case discussed in detail and all questions answered.     Will also have pre-op visit and review and sign consents at that time.     Norco to take prior to and after the [facility].     Will call on BV swab results.    > 30 minutes spent on review of previous records, direct face to face counseling, and physical exam.     CC:   Chief Complaint   Patient presents with    Gynecologic Exam   Curret  HPI: Elisabeth Ford is a 39 y.o. female who presents with complaints of painful, irregular cycles that last 2 weeks with heavy bleeding. Currently c/o pain on left side of abdomen.      She first saw Dr. Carranza during the summer.   2024 PAP was negative.  She had an US done some time in August, but it was never read or signed.     This is Dr. Carranza's note from 2024:  She is 39 years of age  5, para 4 with 1 ectopic pregnancy on the right side.  Patient was been chronically complaining of heavy irregular menstrual cycles with the associated increasing painful periods -dysmenorrhea.  She complains of dyspareunia with referred pain to the right lower quadrant.     Patient is requesting hysterectomy resolve her chronic problems.     Her last menstrual period was 2024.  Menses  was 4 days.  Patient wears tampons for control.  Patient reports flooding through tampons.  She can not wear perineal pads due to allergy the perineal pads.     She does complain of some intermittent hot flash symptomatology.     Patient believes she had laparoscopy for the ectopic pregnancy on the right.  Patient believes procedure was performed by Dr. Elias Hernandez at [facility].  Subsequently to the ectopic pregnancy resection, she did have 2 additional children.  All 4 children were delivered by  section.     Patient reports that Dr. Elias Hernandez delivered all her children.  Patient's youngest child is 17 years of age and the patient has had subsequent to her last  section no contraception.  Patient believes that Dr. Hernandez did not perform a tubal sterilization with the last  section.     Patient denies any galactorrhea.  She was complain of mastodynia.  Patient has never had a mammogram.  Patient has a negative history of breast carcinoma in the family.    She had labs in August which showed BV which was treated. She had insufficiency of Vit D. Thyroid panel negative. CMP wnl, Estradiol wnl (187), FSH wnl (9), LH 17.2 (wnl), Sed rate wnl. HA1C 5.4. Slight anemia (H/H 11.4/36.8), UA wnl    Review of Systems: The following ROS was otherwise negative, except as noted in the HPI:  constitutional, HEENT, respiratory, cardiovascular, gastrointestinal, genitourinary, skin, musculoskeletal, neurological, psych    Gynecologic History:   Unsure of history of abnormal pap smears  No history of of STIs  Menstrual history:       Obstetrical History:  OB History          5    Para   4    Term   4            AB   1    Living   2         SAB        IAB        Ectopic   1    Multiple        Live Births   4           Obstetric Comments   1 ectopic pregnancy               Past Medical History:   History reviewed. No pertinent past medical history.    Medications:  Medication List with  "Changes/Refills   New Medications    CYCLOBENZAPRINE (FLEXERIL) 10 MG TABLET    Take 1 tablet (10 mg total) by mouth 3 (three) times daily as needed for Muscle spasms.    HYDROCODONE-ACETAMINOPHEN (NORCO) 5-325 MG PER TABLET    Take 1 tablet by mouth every 6 (six) hours as needed for Pain.    MISOPROSTOL (CYTOTEC) 200 MCG TAB    Take 2 tablets (400 mcg total) by mouth once. Take the night before the procedure. for 1 dose   Current Medications    BUPROPION (WELLBUTRIN XL) 150 MG TB24 TABLET    Take 1 tablet (150 mg total) by mouth once daily.    CITALOPRAM (CELEXA) 40 MG TABLET    Take 1 tablet (40 mg total) by mouth once daily.    CLORAZEPATE (TRANXENE) 7.5 MG TAB    Take 1 tablet (7.5 mg total) by mouth 2 (two) times daily as needed (anxiety).    -PROPYLENE GLYCOL, PF, (SYSTANE, PF,) 0.4-0.3 % DPET    Apply to eye.    PRAMIPEXOLE (MIRAPEX) 0.125 MG TABLET    Take 1 tablet (0.125 mg total) by mouth 3 (three) times daily.    ROPINIROLE (REQUIP) 0.5 MG TABLET    Take 0.5 mg by mouth 2 (two) times a day.    TRAZODONE (DESYREL) 100 MG TABLET    Take 1 tablet (100 mg total) by mouth nightly as needed for Insomnia.       Allergies:  Phenergan [promethazine]    Surgical History:  Past Surgical History:   Procedure Laterality Date     SECTION         Family History:  No family history on file.    Social History:  Social History     Substance and Sexual Activity   Alcohol Use Not Currently     Social History     Substance and Sexual Activity   Drug Use Never     Social History     Tobacco Use   Smoking Status Some Days    Types: Vaping with nicotine   Smokeless Tobacco Never       Physical Exam:  BP (!) 112/59   Pulse 77   Ht 5' 6" (1.676 m)   Wt 66.3 kg (146 lb 3.2 oz)   SpO2 97%   BMI 23.60 kg/m²     General: Alert, well appearing, no acute distress  Head: Normocephalic, atraumatic  Lungs: Unlabored respirations. Clear to auscultation bilaterally.  Cardiovascular: Regular rate and rhythm.   Abdomen: " Soft, nontender, nondistended   Pelvic:  Exam chaperoned by female assistant.   External: normal female genitalia, no masses or lesions   Vagina: moist, pink mucosa with rugae, thin white discharge, BV swab.  Cervix: no masses or lesions, nontender.   Uterus: approx 9 weeks, mobile, midline, tender  Adnexa: no masses or fullness, tender bilaterally mild  Rectovaginal: deferred  Extremities: No redness or tenderness  Skin: Well perfused, normal coloration and turgor, no lesions or rashes visualized  Neuro: Alert, oriented, normal speech, no focal deficits, moves extremities appropriately  Psych: Normal mood and behavior.    Labs:  Office Visit on 10/21/2024   Component Date Value    Candida Species 10/21/2024 Negative     Gardnerella 10/21/2024 Positive (A)     Trichomonas 10/21/2024 Negative        Alesk Quiroz MD

## 2024-10-22 LAB
CANDIDA SPECIES: NEGATIVE
GARDNERELLA: POSITIVE
TRICHOMONAS: NEGATIVE

## 2024-10-23 ENCOUNTER — PATIENT MESSAGE (OUTPATIENT)
Dept: OBSTETRICS AND GYNECOLOGY | Facility: CLINIC | Age: 39
End: 2024-10-23

## 2024-10-23 ENCOUNTER — TELEPHONE (OUTPATIENT)
Dept: OBSTETRICS AND GYNECOLOGY | Facility: CLINIC | Age: 39
End: 2024-10-23

## 2024-10-23 DIAGNOSIS — N76.0 BACTERIAL VAGINOSIS: Primary | ICD-10-CM

## 2024-10-23 DIAGNOSIS — G89.29 CHRONIC RIGHT LOWER QUADRANT PAIN: ICD-10-CM

## 2024-10-23 DIAGNOSIS — R10.2 PELVIC PAIN: ICD-10-CM

## 2024-10-23 DIAGNOSIS — B96.89 BACTERIAL VAGINOSIS: Primary | ICD-10-CM

## 2024-10-23 DIAGNOSIS — N94.6 DYSMENORRHEA: ICD-10-CM

## 2024-10-23 DIAGNOSIS — N92.1 MENORRHAGIA WITH IRREGULAR CYCLE: ICD-10-CM

## 2024-10-23 DIAGNOSIS — N94.19 DYSPAREUNIA DUE TO MEDICAL CONDITION IN FEMALE: ICD-10-CM

## 2024-10-23 DIAGNOSIS — N92.1 MENOMETRORRHAGIA: ICD-10-CM

## 2024-10-23 DIAGNOSIS — N92.6 IRREGULAR MENSES: Primary | ICD-10-CM

## 2024-10-23 DIAGNOSIS — R10.31 CHRONIC RIGHT LOWER QUADRANT PAIN: ICD-10-CM

## 2024-10-23 RX ORDER — METRONIDAZOLE 500 MG/1
500 TABLET ORAL EVERY 12 HOURS
Qty: 14 TABLET | Refills: 0 | Status: SHIPPED | OUTPATIENT
Start: 2024-10-23 | End: 2024-10-30

## 2024-10-23 RX ORDER — SODIUM CHLORIDE 9 MG/ML
INJECTION, SOLUTION INTRAVENOUS CONTINUOUS
OUTPATIENT
Start: 2024-10-23

## 2024-10-23 RX ORDER — FAMOTIDINE 20 MG/1
20 TABLET, FILM COATED ORAL
OUTPATIENT
Start: 2024-10-23

## 2024-10-23 RX ORDER — MUPIROCIN 20 MG/G
OINTMENT TOPICAL
OUTPATIENT
Start: 2024-10-23

## 2024-10-23 RX ORDER — PHENAZOPYRIDINE HYDROCHLORIDE 100 MG/1
200 TABLET, FILM COATED ORAL
OUTPATIENT
Start: 2024-10-23 | End: 2024-10-23

## 2024-10-23 RX ORDER — CEFAZOLIN SODIUM 2 G/50ML
2 SOLUTION INTRAVENOUS
OUTPATIENT
Start: 2024-10-23

## 2024-10-25 ENCOUNTER — PATIENT MESSAGE (OUTPATIENT)
Dept: OBSTETRICS AND GYNECOLOGY | Facility: CLINIC | Age: 39
End: 2024-10-25

## 2024-10-28 DIAGNOSIS — R10.2 PELVIC PAIN: Primary | ICD-10-CM

## 2024-10-28 RX ORDER — CYCLOBENZAPRINE HCL 10 MG
10 TABLET ORAL 3 TIMES DAILY PRN
Qty: 30 TABLET | Refills: 2 | Status: SHIPPED | OUTPATIENT
Start: 2024-10-28

## 2024-11-04 ENCOUNTER — PATIENT MESSAGE (OUTPATIENT)
Dept: OBSTETRICS AND GYNECOLOGY | Facility: CLINIC | Age: 39
End: 2024-11-04

## 2024-11-06 ENCOUNTER — PROCEDURE VISIT (OUTPATIENT)
Dept: OBSTETRICS AND GYNECOLOGY | Facility: CLINIC | Age: 39
End: 2024-11-06

## 2024-11-06 ENCOUNTER — HOSPITAL ENCOUNTER (OUTPATIENT)
Dept: RADIOLOGY | Facility: HOSPITAL | Age: 39
Discharge: HOME | End: 2024-11-06
Attending: OBSTETRICS & GYNECOLOGY

## 2024-11-06 VITALS
HEART RATE: 78 BPM | DIASTOLIC BLOOD PRESSURE: 79 MMHG | HEIGHT: 66 IN | SYSTOLIC BLOOD PRESSURE: 125 MMHG | BODY MASS INDEX: 23.59 KG/M2 | WEIGHT: 146.81 LBS | OXYGEN SATURATION: 96 %

## 2024-11-06 DIAGNOSIS — G89.18 PAIN ASSOCIATED WITH SURGICAL PROCEDURE: ICD-10-CM

## 2024-11-06 DIAGNOSIS — N92.6 IRREGULAR MENSES: ICD-10-CM

## 2024-11-06 DIAGNOSIS — Z01.818 PRE-OP TESTING: Primary | ICD-10-CM

## 2024-11-06 DIAGNOSIS — N92.0 MENORRHAGIA WITH REGULAR CYCLE: ICD-10-CM

## 2024-11-06 DIAGNOSIS — R10.2 PELVIC PAIN: ICD-10-CM

## 2024-11-06 DIAGNOSIS — N94.19 DYSPAREUNIA DUE TO MEDICAL CONDITION IN FEMALE: ICD-10-CM

## 2024-11-06 DIAGNOSIS — N94.6 DYSMENORRHEA: ICD-10-CM

## 2024-11-06 LAB
B-HCG UR QL: NEGATIVE
CTP QC/QA: YES

## 2024-11-06 PROCEDURE — 99999PBSHW POCT URINE PREGNANCY: Mod: PBBFAC,,,

## 2024-11-06 PROCEDURE — 96372 THER/PROPH/DIAG INJ SC/IM: CPT | Mod: PBBFAC | Performed by: OBSTETRICS & GYNECOLOGY

## 2024-11-06 PROCEDURE — 88305 TISSUE EXAM BY PATHOLOGIST: CPT | Mod: 26,,, | Performed by: PATHOLOGY

## 2024-11-06 PROCEDURE — 76856 US EXAM PELVIC COMPLETE: CPT | Mod: TC

## 2024-11-06 PROCEDURE — 99999PBSHW PR PBB SHADOW TECHNICAL ONLY FILED TO HB: Mod: PBBFAC,,,

## 2024-11-06 PROCEDURE — 76856 US EXAM PELVIC COMPLETE: CPT | Mod: 26,,, | Performed by: RADIOLOGY

## 2024-11-06 PROCEDURE — 58100 BIOPSY OF UTERUS LINING: CPT | Mod: PBBFAC | Performed by: OBSTETRICS & GYNECOLOGY

## 2024-11-06 PROCEDURE — 88305 TISSUE EXAM BY PATHOLOGIST: CPT | Mod: TC,SUR | Performed by: OBSTETRICS & GYNECOLOGY

## 2024-11-06 PROCEDURE — 76830 TRANSVAGINAL US NON-OB: CPT | Mod: 26,,, | Performed by: RADIOLOGY

## 2024-11-06 PROCEDURE — 81025 URINE PREGNANCY TEST: CPT | Mod: PBBFAC | Performed by: OBSTETRICS & GYNECOLOGY

## 2024-11-06 RX ORDER — KETOROLAC TROMETHAMINE 30 MG/ML
60 INJECTION, SOLUTION INTRAMUSCULAR; INTRAVENOUS
Status: COMPLETED | OUTPATIENT
Start: 2024-11-06 | End: 2024-11-06

## 2024-11-06 RX ORDER — MISOPROSTOL 200 UG/1
400 TABLET ORAL ONCE
Qty: 2 TABLET | Refills: 0 | Status: SHIPPED | OUTPATIENT
Start: 2024-11-06 | End: 2024-11-06

## 2024-11-06 RX ADMIN — KETOROLAC TROMETHAMINE 60 MG: 30 INJECTION, SOLUTION INTRAMUSCULAR; INTRAVENOUS at 04:11

## 2024-11-08 ENCOUNTER — HOSPITAL ENCOUNTER (EMERGENCY)
Facility: HOSPITAL | Age: 39
Discharge: HOME | End: 2024-11-09
Attending: EMERGENCY MEDICINE

## 2024-11-08 DIAGNOSIS — R10.2 PELVIC PAIN: Primary | ICD-10-CM

## 2024-11-08 DIAGNOSIS — R10.9 ABDOMINAL PAIN, UNSPECIFIED ABDOMINAL LOCATION: Primary | ICD-10-CM

## 2024-11-08 LAB
ALBUMIN SERPL BCP-MCNC: 3.2 G/DL (ref 3.5–5)
ALBUMIN/GLOB SERPL: 1 {RATIO}
ALP SERPL-CCNC: 71 U/L (ref 37–98)
ALT SERPL W P-5'-P-CCNC: 18 U/L (ref 13–56)
ANION GAP SERPL CALCULATED.3IONS-SCNC: 7 MMOL/L (ref 7–16)
AST SERPL W P-5'-P-CCNC: 20 U/L (ref 15–37)
B-HCG UR QL: NEGATIVE
BACTERIA #/AREA URNS HPF: ABNORMAL /HPF
BASOPHILS # BLD AUTO: 0.06 K/UL (ref 0–0.2)
BASOPHILS NFR BLD AUTO: 0.9 % (ref 0–1)
BILIRUB SERPL-MCNC: 0.4 MG/DL (ref ?–1.2)
BILIRUB UR QL STRIP: NEGATIVE
BUN SERPL-MCNC: 9 MG/DL (ref 7–18)
BUN/CREAT SERPL: 11 (ref 6–20)
CALCIUM SERPL-MCNC: 8.7 MG/DL (ref 8.5–10.1)
CHLORIDE SERPL-SCNC: 110 MMOL/L (ref 98–107)
CLARITY UR: CLEAR
CO2 SERPL-SCNC: 28 MMOL/L (ref 21–32)
COLOR UR: ABNORMAL
CREAT SERPL-MCNC: 0.8 MG/DL (ref 0.55–1.02)
CTP QC/QA: YES
DIFFERENTIAL METHOD BLD: ABNORMAL
EGFR (NO RACE VARIABLE) (RUSH/TITUS): 95 ML/MIN/1.73M2
EOSINOPHIL # BLD AUTO: 0.26 K/UL (ref 0–0.5)
EOSINOPHIL NFR BLD AUTO: 3.8 % (ref 1–4)
ERYTHROCYTE [DISTWIDTH] IN BLOOD BY AUTOMATED COUNT: 12.9 % (ref 11.5–14.5)
ESTROGEN SERPL-MCNC: NORMAL PG/ML
GLOBULIN SER-MCNC: 3.2 G/DL (ref 2–4)
GLUCOSE SERPL-MCNC: 103 MG/DL (ref 74–106)
GLUCOSE UR STRIP-MCNC: NORMAL MG/DL
HCT VFR BLD AUTO: 34.2 % (ref 38–47)
HGB BLD-MCNC: 10.8 G/DL (ref 12–16)
IMM GRANULOCYTES # BLD AUTO: 0.02 K/UL (ref 0–0.04)
IMM GRANULOCYTES NFR BLD: 0.3 % (ref 0–0.4)
INSULIN SERPL-ACNC: NORMAL U[IU]/ML
KETONES UR STRIP-SCNC: NEGATIVE MG/DL
LAB AP CLINICAL INFORMATION: NORMAL
LAB AP GROSS DESCRIPTION: NORMAL
LAB AP LABORATORY NOTES: NORMAL
LEUKOCYTE ESTERASE UR QL STRIP: NEGATIVE
LIPASE SERPL-CCNC: 21 U/L (ref 16–77)
LYMPHOCYTES # BLD AUTO: 2.12 K/UL (ref 1–4.8)
LYMPHOCYTES NFR BLD AUTO: 30.9 % (ref 27–41)
MCH RBC QN AUTO: 28.8 PG (ref 27–31)
MCHC RBC AUTO-ENTMCNC: 31.6 G/DL (ref 32–36)
MCV RBC AUTO: 91.2 FL (ref 80–96)
MONOCYTES # BLD AUTO: 0.56 K/UL (ref 0–0.8)
MONOCYTES NFR BLD AUTO: 8.2 % (ref 2–6)
MPC BLD CALC-MCNC: 11.1 FL (ref 9.4–12.4)
MUCOUS, UA: ABNORMAL /LPF
NEUTROPHILS # BLD AUTO: 3.85 K/UL (ref 1.8–7.7)
NEUTROPHILS NFR BLD AUTO: 55.9 % (ref 53–65)
NITRITE UR QL STRIP: POSITIVE
NRBC # BLD AUTO: 0 X10E3/UL
NRBC, AUTO (.00): 0 %
PH UR STRIP: 7.5 PH UNITS
PLATELET # BLD AUTO: 249 K/UL (ref 150–400)
POTASSIUM SERPL-SCNC: 3.8 MMOL/L (ref 3.5–5.1)
PROT SERPL-MCNC: 6.4 G/DL (ref 6.4–8.2)
PROT UR QL STRIP: 10
RBC # BLD AUTO: 3.75 M/UL (ref 4.2–5.4)
RBC # UR STRIP: NEGATIVE /UL
RBC #/AREA URNS HPF: 1 /HPF
SODIUM SERPL-SCNC: 141 MMOL/L (ref 136–145)
SP GR UR STRIP: 1.02
SQUAMOUS #/AREA URNS LPF: ABNORMAL /HPF
T3RU NFR SERPL: NORMAL %
UROBILINOGEN UR STRIP-ACNC: NORMAL MG/DL
WBC # BLD AUTO: 6.87 K/UL (ref 4.5–11)
WBC #/AREA URNS HPF: 5 /HPF

## 2024-11-08 PROCEDURE — 85025 COMPLETE CBC W/AUTO DIFF WBC: CPT | Performed by: EMERGENCY MEDICINE

## 2024-11-08 PROCEDURE — 81003 URINALYSIS AUTO W/O SCOPE: CPT | Performed by: EMERGENCY MEDICINE

## 2024-11-08 PROCEDURE — 96374 THER/PROPH/DIAG INJ IV PUSH: CPT

## 2024-11-08 PROCEDURE — 36415 COLL VENOUS BLD VENIPUNCTURE: CPT | Performed by: EMERGENCY MEDICINE

## 2024-11-08 PROCEDURE — 63600175 PHARM REV CODE 636 W HCPCS: Performed by: EMERGENCY MEDICINE

## 2024-11-08 PROCEDURE — 87186 SC STD MICRODIL/AGAR DIL: CPT | Performed by: EMERGENCY MEDICINE

## 2024-11-08 PROCEDURE — 99285 EMERGENCY DEPT VISIT HI MDM: CPT | Mod: 25

## 2024-11-08 PROCEDURE — 81025 URINE PREGNANCY TEST: CPT | Performed by: EMERGENCY MEDICINE

## 2024-11-08 PROCEDURE — 80053 COMPREHEN METABOLIC PANEL: CPT | Performed by: EMERGENCY MEDICINE

## 2024-11-08 PROCEDURE — 83690 ASSAY OF LIPASE: CPT | Performed by: EMERGENCY MEDICINE

## 2024-11-08 RX ORDER — IBUPROFEN 800 MG/1
800 TABLET ORAL EVERY 8 HOURS PRN
Qty: 30 TABLET | Refills: 1 | Status: SHIPPED | OUTPATIENT
Start: 2024-11-08

## 2024-11-08 RX ORDER — KETOROLAC TROMETHAMINE 15 MG/ML
15 INJECTION, SOLUTION INTRAMUSCULAR; INTRAVENOUS
Status: COMPLETED | OUTPATIENT
Start: 2024-11-08 | End: 2024-11-08

## 2024-11-08 RX ADMIN — KETOROLAC TROMETHAMINE 15 MG: 15 INJECTION, SOLUTION INTRAMUSCULAR; INTRAVENOUS at 10:11

## 2024-11-09 VITALS
DIASTOLIC BLOOD PRESSURE: 58 MMHG | TEMPERATURE: 98 F | OXYGEN SATURATION: 99 % | HEART RATE: 76 BPM | HEIGHT: 66 IN | WEIGHT: 148 LBS | RESPIRATION RATE: 18 BRPM | BODY MASS INDEX: 23.78 KG/M2 | SYSTOLIC BLOOD PRESSURE: 99 MMHG

## 2024-11-09 PROCEDURE — 63600175 PHARM REV CODE 636 W HCPCS: Performed by: EMERGENCY MEDICINE

## 2024-11-09 PROCEDURE — 96376 TX/PRO/DX INJ SAME DRUG ADON: CPT

## 2024-11-09 PROCEDURE — 25500020 PHARM REV CODE 255: Performed by: EMERGENCY MEDICINE

## 2024-11-09 PROCEDURE — 96375 TX/PRO/DX INJ NEW DRUG ADDON: CPT

## 2024-11-09 PROCEDURE — 25000003 PHARM REV CODE 250: Performed by: EMERGENCY MEDICINE

## 2024-11-09 RX ORDER — CYCLOBENZAPRINE HCL 10 MG
10 TABLET ORAL 3 TIMES DAILY PRN
Qty: 15 TABLET | Refills: 0 | Status: SHIPPED | OUTPATIENT
Start: 2024-11-09 | End: 2024-11-14

## 2024-11-09 RX ORDER — MORPHINE SULFATE 4 MG/ML
4 INJECTION, SOLUTION INTRAMUSCULAR; INTRAVENOUS
Status: COMPLETED | OUTPATIENT
Start: 2024-11-09 | End: 2024-11-09

## 2024-11-09 RX ORDER — OXYCODONE AND ACETAMINOPHEN 5; 325 MG/1; MG/1
1 TABLET ORAL
Status: COMPLETED | OUTPATIENT
Start: 2024-11-09 | End: 2024-11-09

## 2024-11-09 RX ORDER — ONDANSETRON HYDROCHLORIDE 2 MG/ML
4 INJECTION, SOLUTION INTRAVENOUS
Status: COMPLETED | OUTPATIENT
Start: 2024-11-09 | End: 2024-11-09

## 2024-11-09 RX ORDER — IOPAMIDOL 755 MG/ML
100 INJECTION, SOLUTION INTRAVASCULAR
Status: COMPLETED | OUTPATIENT
Start: 2024-11-09 | End: 2024-11-09

## 2024-11-09 RX ORDER — KETOROLAC TROMETHAMINE 15 MG/ML
15 INJECTION, SOLUTION INTRAMUSCULAR; INTRAVENOUS
Status: COMPLETED | OUTPATIENT
Start: 2024-11-09 | End: 2024-11-09

## 2024-11-09 RX ADMIN — IOPAMIDOL 100 ML: 755 INJECTION, SOLUTION INTRAVENOUS at 12:11

## 2024-11-09 RX ADMIN — ONDANSETRON 4 MG: 2 INJECTION INTRAMUSCULAR; INTRAVENOUS at 01:11

## 2024-11-09 RX ADMIN — MORPHINE SULFATE 4 MG: 4 INJECTION INTRAVENOUS at 01:11

## 2024-11-09 RX ADMIN — OXYCODONE HYDROCHLORIDE AND ACETAMINOPHEN 1 TABLET: 5; 325 TABLET ORAL at 02:11

## 2024-11-09 RX ADMIN — KETOROLAC TROMETHAMINE 15 MG: 15 INJECTION, SOLUTION INTRAMUSCULAR; INTRAVENOUS at 01:11

## 2024-11-09 NOTE — ED PROVIDER NOTES
Encounter Date: 2024    SCRIBE #1 NOTE: I, Ricky Lazcano, am scribing for, and in the presence of,  Steven Linares MD. I have scribed the entire note.       History     Chief Complaint   Patient presents with    Abdominal Pain     Pt c/o RLQ abd pain and pain is radiating to right leg. Pt states she had a bx of her uterus on . Pt was told by dr lubna richmond to come to the ER.      39 y.o.female presented to the ED for abdominal pain. She had a biopsy performed on 2024 for a fibroid on her uterus. She is now having right sided pain on her abdomen. She denies chills and thinks she may have had a fever last night. She has pain while urinating and she has to apply pressure to the right side. She is scheduled for a hysterectomy on 2024. She has HX of smoking and no medical HX on file.         The history is provided by the patient and the spouse. No  was used.     Review of patient's allergies indicates:   Allergen Reactions    Phenergan [promethazine] Swelling     History reviewed. No pertinent past medical history.  Past Surgical History:   Procedure Laterality Date     SECTION       No family history on file.  Social History     Tobacco Use    Smoking status: Some Days     Types: Vaping with nicotine    Smokeless tobacco: Never   Substance Use Topics    Alcohol use: Not Currently    Drug use: Never     Review of Systems   Gastrointestinal:  Positive for abdominal pain.   Genitourinary:         Pain while urinating    All other systems reviewed and are negative.      Physical Exam     Initial Vitals [248]   BP Pulse Resp Temp SpO2   121/74 84 18 98 °F (36.7 °C) 98 %      MAP       --         Physical Exam    Nursing note and vitals reviewed.  Constitutional: She appears well-developed and well-nourished.   HENT:   Head: Normocephalic and atraumatic.   Eyes: EOM are normal. Pupils are equal, round, and reactive to light.   Neck: Neck supple. No  thyromegaly present.   Normal range of motion.  Cardiovascular:  Normal rate, regular rhythm, normal heart sounds and intact distal pulses.           No murmur heard.  Pulmonary/Chest: Breath sounds normal. She has no wheezes.   Abdominal: Abdomen is soft. Bowel sounds are normal. There is abdominal tenderness (RIGHT SIDE).   Musculoskeletal:         General: No tenderness or edema. Normal range of motion.      Cervical back: Normal range of motion and neck supple.     Lymphadenopathy:     She has no cervical adenopathy.   Neurological: She is alert and oriented to person, place, and time. She has normal strength and normal reflexes. No cranial nerve deficit or sensory deficit. GCS score is 15. GCS eye subscore is 4. GCS verbal subscore is 5. GCS motor subscore is 6.   Skin: Skin is warm and dry. Capillary refill takes less than 2 seconds. No rash noted.   Psychiatric: She has a normal mood and affect.         ED Course   Procedures  Labs Reviewed   CULTURE, URINE - Abnormal       Result Value    Culture, Urine >100,000 Gram-negative Bacilli (*)    COMPREHENSIVE METABOLIC PANEL - Abnormal    Sodium 141      Potassium 3.8      Chloride 110 (*)     CO2 28      Anion Gap 7      Glucose 103      BUN 9      Creatinine 0.80      BUN/Creatinine Ratio 11      Calcium 8.7      Total Protein 6.4      Albumin 3.2 (*)     Globulin 3.2      A/G Ratio 1.0      Bilirubin, Total 0.4      Alk Phos 71      ALT 18      AST 20      eGFR 95     URINALYSIS, REFLEX TO URINE CULTURE - Abnormal    Color, UA Light-Yellow      Clarity, UA Clear      pH, UA 7.5      Leukocytes, UA Negative      Nitrites, UA Positive (*)     Protein, UA 10 (*)     Glucose, UA Normal      Ketones, UA Negative      Urobilinogen, UA Normal      Bilirubin, UA Negative      Blood, UA Negative      Specific Gravity, UA 1.020     CBC WITH DIFFERENTIAL - Abnormal    WBC 6.87      RBC 3.75 (*)     Hemoglobin 10.8 (*)     Hematocrit 34.2 (*)     MCV 91.2      MCH 28.8       MCHC 31.6 (*)     RDW 12.9      Platelet Count 249      MPV 11.1      Neutrophils % 55.9      Lymphocytes % 30.9      Monocytes % 8.2 (*)     Eosinophils % 3.8      Basophils % 0.9      Immature Granulocytes % 0.3      nRBC, Auto 0.0      Neutrophils, Abs 3.85      Lymphocytes, Absolute 2.12      Monocytes, Absolute 0.56      Eosinophils, Absolute 0.26      Basophils, Absolute 0.06      Immature Granulocytes, Absolute 0.02      nRBC, Absolute 0.00      Diff Type Auto     URINALYSIS, MICROSCOPIC - Abnormal    WBC, UA 5      RBC, UA 1      Bacteria, UA Moderate (*)     Squamous Epithelial Cells, UA Occasional (*)     Mucous Occasional (*)    LIPASE - Normal    Lipase 21     CBC W/ AUTO DIFFERENTIAL    Narrative:     The following orders were created for panel order CBC W/ AUTO DIFFERENTIAL.  Procedure                               Abnormality         Status                     ---------                               -----------         ------                     CBC with Differential[[id number]]       Abnormal            Final result                 Please view results for these tests on the individual orders.   POCT URINE PREGNANCY    POC Preg Test, Ur Negative       Acceptable Yes            Imaging Results              CT Abdomen Pelvis With IV Contrast NO Oral Contrast (Final result)  Result time 11/09/24 09:32:52      Final result by Violeta Garcia MD (11/09/24 09:32:52)                   Impression:      No acute abnormality within the abdomen or pelvis.    Indeterminate right renal lesion measuring 15 mm although most likely a cyst, strictly indeterminate.  Recommend non emergent follow-up with renal ultrasound.      Electronically signed by: Violeta Garcia  Date:    11/09/2024  Time:    09:32               Narrative:    EXAMINATION:  CT ABDOMEN PELVIS WITH IV CONTRAST    CLINICAL HISTORY:  Abdominal pain, acute, nonlocalized;    TECHNIQUE:  Low dose axial images, sagittal and coronal  reformations were obtained from the lung bases to the pubic symphysis following the IV administration of 100 mL of Isovue 370.  .  Oral contrast was not given.    COMPARISON:  Pelvic ultrasound 11/06/2024    FINDINGS:  Heart size is normal.  No pericardial effusion.  Lung bases are clear.    Liver is normal in morphology and with smooth contour.  No focal hepatic lesion.  Gallbladder is decompressed.  No radiopaque calculi.  No intra or extrahepatic biliary ductal dilatation.  The portal, splenic and superior mesenteric veins are patent.    The spleen, adrenal glands, and pancreas are normal.  Right renal 15 mm indeterminate lesion (series 3, image 30).    Abdominal aorta is normal in caliber.  No enlarged retroperitoneal lymph nodes.    No bowel obstruction or inflammatory change.  Normal appendix.  No free fluid or free air.    Bladder is smooth walled.  Normal uterus.  No adnexal mass.  No enlarged pelvic lymph nodes.    Soft tissues are unremarkable.  No acute or aggressive osseous abnormality..                                       Medications   ketorolac injection 15 mg (15 mg Intravenous Given 11/8/24 2211)   iopamidoL (ISOVUE-370) injection 100 mL (100 mLs Intravenous Given 11/9/24 0016)   ketorolac injection 15 mg (15 mg Intravenous Given 11/9/24 0116)   ondansetron injection 4 mg (4 mg Intravenous Given 11/9/24 0116)   morphine injection 4 mg (4 mg Intravenous Given 11/9/24 0116)   oxyCODONE-acetaminophen 5-325 mg per tablet 1 tablet (1 tablet Oral Given 11/9/24 0202)     Medical Decision Making            Attending Attestation:           Physician Attestation for Scribe:  Physician Attestation Statement for Scribe #1: I, Steven Linares MD, reviewed documentation, as scribed by Ricky Lazcano in my presence, and it is both accurate and complete.             ED Course as of 11/09/24 2234 Fri Nov 08, 2024 2146 MDM:  A 39-year-old female patient who had uterine biopsy 2 days ago; came to the  emergency department complaining of right upper quadrant abdominal pain and tenderness.  Also the patient complains of painful urination.  She mentioned that she has to push on her right flank to urinate.  Differential diagnosis right flank hematoma, kidney stone, ruptured ovarian cyst, acute cholecystitis. [HK]      ED Course User Index  [HK] Steven Linares MD                             Clinical Impression:  Final diagnoses:  [R10.9] Abdominal pain, unspecified abdominal location (Primary)          ED Disposition Condition    Discharge Stable          ED Prescriptions       Medication Sig Dispense Start Date End Date Auth. Provider    cyclobenzaprine (FLEXERIL) 10 MG tablet Take 1 tablet (10 mg total) by mouth 3 (three) times daily as needed for Muscle spasms. 15 tablet 11/9/2024 11/14/2024 Steven Linares MD          Follow-up Information    None          Steven Linares MD  11/09/24 6718

## 2024-11-10 ENCOUNTER — TELEPHONE (OUTPATIENT)
Dept: EMERGENCY MEDICINE | Facility: HOSPITAL | Age: 39
End: 2024-11-10

## 2024-11-10 LAB — UA COMPLETE W REFLEX CULTURE PNL UR: ABNORMAL

## 2024-11-10 RX ORDER — SULFAMETHOXAZOLE AND TRIMETHOPRIM 800; 160 MG/1; MG/1
1 TABLET ORAL 2 TIMES DAILY
Qty: 14 TABLET | Refills: 0 | Status: SHIPPED | OUTPATIENT
Start: 2024-11-10 | End: 2024-11-17

## 2024-11-10 NOTE — TELEPHONE ENCOUNTER
----- Message from RENETTA Royal sent at 11/10/2024  8:44 AM CST -----  Please have the patient follow-up with her primary care provider regarding the CT significant for right renal lesion measuring 15 mm although most likely a cyst, strictly indeterminate.  Recommend non emergent follow-up with renal ultrasound.

## 2024-11-10 NOTE — TELEPHONE ENCOUNTER
----- Message from RENETTA Royal sent at 11/10/2024  8:47 AM CST -----  Please notify the patient that I sent in a prescription for antibiotics

## 2024-11-11 ENCOUNTER — PATIENT MESSAGE (OUTPATIENT)
Dept: OBSTETRICS AND GYNECOLOGY | Facility: CLINIC | Age: 39
End: 2024-11-11

## 2024-11-11 DIAGNOSIS — N28.9 RENAL LESION: Primary | ICD-10-CM

## 2024-11-11 DIAGNOSIS — N93.9 ABNORMAL UTERINE BLEEDING: Primary | ICD-10-CM

## 2024-11-12 RX ORDER — NORETHINDRONE 5 MG/1
5 TABLET ORAL 2 TIMES DAILY
Qty: 60 TABLET | Refills: 0 | Status: SHIPPED | OUTPATIENT
Start: 2024-11-12 | End: 2024-12-12

## 2024-11-13 ENCOUNTER — PATIENT MESSAGE (OUTPATIENT)
Dept: OBSTETRICS AND GYNECOLOGY | Facility: CLINIC | Age: 39
End: 2024-11-13

## 2024-11-13 DIAGNOSIS — N28.9 RENAL LESION: Primary | ICD-10-CM

## 2024-12-04 ENCOUNTER — ANESTHESIA EVENT (OUTPATIENT)
Dept: SURGERY | Facility: HOSPITAL | Age: 39
End: 2024-12-04

## 2024-12-04 ENCOUNTER — HOSPITAL ENCOUNTER (INPATIENT)
Facility: HOSPITAL | Age: 39
LOS: 1 days | Discharge: HOME | DRG: 743 | End: 2024-12-05
Attending: OBSTETRICS & GYNECOLOGY | Admitting: OBSTETRICS & GYNECOLOGY

## 2024-12-04 ENCOUNTER — ANESTHESIA (OUTPATIENT)
Dept: SURGERY | Facility: HOSPITAL | Age: 39
End: 2024-12-04

## 2024-12-04 DIAGNOSIS — R10.31 CHRONIC RIGHT LOWER QUADRANT PAIN: ICD-10-CM

## 2024-12-04 DIAGNOSIS — N92.1 MENORRHAGIA WITH IRREGULAR CYCLE: ICD-10-CM

## 2024-12-04 DIAGNOSIS — N92.1 MENOMETRORRHAGIA: ICD-10-CM

## 2024-12-04 DIAGNOSIS — G89.29 CHRONIC RIGHT LOWER QUADRANT PAIN: ICD-10-CM

## 2024-12-04 DIAGNOSIS — Z48.89 ENCOUNTER FOR POSTOPERATIVE CARE: Primary | ICD-10-CM

## 2024-12-04 DIAGNOSIS — N94.19 DYSPAREUNIA DUE TO MEDICAL CONDITION IN FEMALE: ICD-10-CM

## 2024-12-04 DIAGNOSIS — N94.6 DYSMENORRHEA: ICD-10-CM

## 2024-12-04 DIAGNOSIS — Z01.818 PREOPERATIVE CLEARANCE: ICD-10-CM

## 2024-12-04 DIAGNOSIS — R10.2 PELVIC PAIN: ICD-10-CM

## 2024-12-04 PROBLEM — D25.1 INTRAMURAL LEIOMYOMA OF UTERUS: Status: ACTIVE | Noted: 2024-12-04

## 2024-12-04 LAB
ABORH RETYPE: NORMAL
ANION GAP SERPL CALCULATED.3IONS-SCNC: 8 MMOL/L (ref 7–16)
B-HCG UR QL: NEGATIVE
BASOPHILS # BLD AUTO: 0.06 K/UL (ref 0–0.2)
BASOPHILS NFR BLD AUTO: 1 % (ref 0–1)
BUN SERPL-MCNC: 17 MG/DL (ref 7–19)
BUN/CREAT SERPL: 23 (ref 6–20)
CALCIUM SERPL-MCNC: 8.6 MG/DL (ref 8.4–10.2)
CHLORIDE SERPL-SCNC: 111 MMOL/L (ref 98–107)
CO2 SERPL-SCNC: 24 MMOL/L (ref 22–29)
CREAT SERPL-MCNC: 0.73 MG/DL (ref 0.55–1.02)
CTP QC/QA: YES
DIFFERENTIAL METHOD BLD: ABNORMAL
EGFR (NO RACE VARIABLE) (RUSH/TITUS): 106 ML/MIN/1.73M2
EOSINOPHIL # BLD AUTO: 0.18 K/UL (ref 0–0.5)
EOSINOPHIL NFR BLD AUTO: 3.1 % (ref 1–4)
ERYTHROCYTE [DISTWIDTH] IN BLOOD BY AUTOMATED COUNT: 13.2 % (ref 11.5–14.5)
GLUCOSE SERPL-MCNC: 81 MG/DL (ref 74–100)
HCT VFR BLD AUTO: 31.8 % (ref 38–47)
HGB BLD-MCNC: 10.2 G/DL (ref 12–16)
IMM GRANULOCYTES # BLD AUTO: 0.01 K/UL (ref 0–0.04)
IMM GRANULOCYTES NFR BLD: 0.2 % (ref 0–0.4)
INDIRECT COOMBS: NORMAL
LYMPHOCYTES # BLD AUTO: 1.6 K/UL (ref 1–4.8)
LYMPHOCYTES NFR BLD AUTO: 27.7 % (ref 27–41)
MCH RBC QN AUTO: 28.8 PG (ref 27–31)
MCHC RBC AUTO-ENTMCNC: 32.1 G/DL (ref 32–36)
MCV RBC AUTO: 89.8 FL (ref 80–96)
MONOCYTES # BLD AUTO: 0.49 K/UL (ref 0–0.8)
MONOCYTES NFR BLD AUTO: 8.5 % (ref 2–6)
MPC BLD CALC-MCNC: 11 FL (ref 9.4–12.4)
NEUTROPHILS # BLD AUTO: 3.43 K/UL (ref 1.8–7.7)
NEUTROPHILS NFR BLD AUTO: 59.5 % (ref 53–65)
NRBC # BLD AUTO: 0 X10E3/UL
NRBC, AUTO (.00): 0 %
PLATELET # BLD AUTO: 255 K/UL (ref 150–400)
POTASSIUM SERPL-SCNC: 3.4 MMOL/L (ref 3.5–5.1)
RBC # BLD AUTO: 3.54 M/UL (ref 4.2–5.4)
RH BLD: NORMAL
SODIUM SERPL-SCNC: 140 MMOL/L (ref 136–145)
SPECIMEN OUTDATE: NORMAL
WBC # BLD AUTO: 5.77 K/UL (ref 4.5–11)

## 2024-12-04 PROCEDURE — 36415 COLL VENOUS BLD VENIPUNCTURE: CPT | Performed by: OBSTETRICS & GYNECOLOGY

## 2024-12-04 PROCEDURE — 36000713 HC OR TIME LEV V EA ADD 15 MIN: Performed by: OBSTETRICS & GYNECOLOGY

## 2024-12-04 PROCEDURE — 0UT74ZZ RESECTION OF BILATERAL FALLOPIAN TUBES, PERCUTANEOUS ENDOSCOPIC APPROACH: ICD-10-PCS | Performed by: OBSTETRICS & GYNECOLOGY

## 2024-12-04 PROCEDURE — 27000165 HC TUBE, ETT CUFFED: Performed by: NURSE ANESTHETIST, CERTIFIED REGISTERED

## 2024-12-04 PROCEDURE — 27000509 HC TUBE SALEM SUMP ANY SIZE: Performed by: NURSE ANESTHETIST, CERTIFIED REGISTERED

## 2024-12-04 PROCEDURE — 25000003 PHARM REV CODE 250: Performed by: NURSE ANESTHETIST, CERTIFIED REGISTERED

## 2024-12-04 PROCEDURE — C2628 CATHETER, OCCLUSION: HCPCS | Performed by: OBSTETRICS & GYNECOLOGY

## 2024-12-04 PROCEDURE — 81025 URINE PREGNANCY TEST: CPT | Performed by: OBSTETRICS & GYNECOLOGY

## 2024-12-04 PROCEDURE — 0UT94ZZ RESECTION OF UTERUS, PERCUTANEOUS ENDOSCOPIC APPROACH: ICD-10-PCS | Performed by: OBSTETRICS & GYNECOLOGY

## 2024-12-04 PROCEDURE — 25000003 PHARM REV CODE 250: Performed by: OBSTETRICS & GYNECOLOGY

## 2024-12-04 PROCEDURE — 11000001 HC ACUTE MED/SURG PRIVATE ROOM

## 2024-12-04 PROCEDURE — 63600175 PHARM REV CODE 636 W HCPCS: Performed by: ANESTHESIOLOGY

## 2024-12-04 PROCEDURE — 27000510 HC BLANKET BAIR HUGGER ANY SIZE: Performed by: NURSE ANESTHETIST, CERTIFIED REGISTERED

## 2024-12-04 PROCEDURE — 63600175 PHARM REV CODE 636 W HCPCS: Performed by: OBSTETRICS & GYNECOLOGY

## 2024-12-04 PROCEDURE — 99900035 HC TECH TIME PER 15 MIN (STAT)

## 2024-12-04 PROCEDURE — 27202344 HC EYESHIELD: Performed by: NURSE ANESTHETIST, CERTIFIED REGISTERED

## 2024-12-04 PROCEDURE — 27000689 HC BLADE LARYNGOSCOPE ANY SIZE: Performed by: NURSE ANESTHETIST, CERTIFIED REGISTERED

## 2024-12-04 PROCEDURE — 71000033 HC RECOVERY, INTIAL HOUR: Performed by: OBSTETRICS & GYNECOLOGY

## 2024-12-04 PROCEDURE — 80048 BASIC METABOLIC PNL TOTAL CA: CPT | Performed by: OBSTETRICS & GYNECOLOGY

## 2024-12-04 PROCEDURE — 63600175 PHARM REV CODE 636 W HCPCS: Mod: JZ,JG | Performed by: OBSTETRICS & GYNECOLOGY

## 2024-12-04 PROCEDURE — 37000008 HC ANESTHESIA 1ST 15 MINUTES: Performed by: OBSTETRICS & GYNECOLOGY

## 2024-12-04 PROCEDURE — 71000039 HC RECOVERY, EACH ADD'L HOUR: Performed by: OBSTETRICS & GYNECOLOGY

## 2024-12-04 PROCEDURE — 0DNW4ZZ RELEASE PERITONEUM, PERCUTANEOUS ENDOSCOPIC APPROACH: ICD-10-PCS | Performed by: OBSTETRICS & GYNECOLOGY

## 2024-12-04 PROCEDURE — 27000716 HC OXISENSOR PROBE, ANY SIZE: Performed by: NURSE ANESTHETIST, CERTIFIED REGISTERED

## 2024-12-04 PROCEDURE — 88307 TISSUE EXAM BY PATHOLOGIST: CPT | Mod: TC,SUR | Performed by: SURGERY

## 2024-12-04 PROCEDURE — 27200700 HC TUBE, ENDOTRACHEAL NASAL RAE: Performed by: NURSE ANESTHETIST, CERTIFIED REGISTERED

## 2024-12-04 PROCEDURE — 37000009 HC ANESTHESIA EA ADD 15 MINS: Performed by: OBSTETRICS & GYNECOLOGY

## 2024-12-04 PROCEDURE — 27201423 OPTIME MED/SURG SUP & DEVICES STERILE SUPPLY: Performed by: OBSTETRICS & GYNECOLOGY

## 2024-12-04 PROCEDURE — 36000712 HC OR TIME LEV V 1ST 15 MIN: Performed by: OBSTETRICS & GYNECOLOGY

## 2024-12-04 PROCEDURE — 94761 N-INVAS EAR/PLS OXIMETRY MLT: CPT

## 2024-12-04 PROCEDURE — 93010 ELECTROCARDIOGRAM REPORT: CPT | Mod: ,,, | Performed by: INTERNAL MEDICINE

## 2024-12-04 PROCEDURE — 88307 TISSUE EXAM BY PATHOLOGIST: CPT | Mod: 26,,, | Performed by: PATHOLOGY

## 2024-12-04 PROCEDURE — 36415 COLL VENOUS BLD VENIPUNCTURE: CPT | Mod: 91 | Performed by: OBSTETRICS & GYNECOLOGY

## 2024-12-04 PROCEDURE — 44180 LAP ENTEROLYSIS: CPT | Mod: ,,, | Performed by: SURGERY

## 2024-12-04 PROCEDURE — 27000655: Performed by: NURSE ANESTHETIST, CERTIFIED REGISTERED

## 2024-12-04 PROCEDURE — 63600175 PHARM REV CODE 636 W HCPCS: Performed by: NURSE ANESTHETIST, CERTIFIED REGISTERED

## 2024-12-04 PROCEDURE — 86900 BLOOD TYPING SEROLOGIC ABO: CPT | Performed by: OBSTETRICS & GYNECOLOGY

## 2024-12-04 PROCEDURE — 93005 ELECTROCARDIOGRAM TRACING: CPT

## 2024-12-04 PROCEDURE — 8E0W4CZ ROBOTIC ASSISTED PROCEDURE OF TRUNK REGION, PERCUTANEOUS ENDOSCOPIC APPROACH: ICD-10-PCS | Performed by: OBSTETRICS & GYNECOLOGY

## 2024-12-04 PROCEDURE — 0TJB8ZZ INSPECTION OF BLADDER, VIA NATURAL OR ARTIFICIAL OPENING ENDOSCOPIC: ICD-10-PCS | Performed by: OBSTETRICS & GYNECOLOGY

## 2024-12-04 PROCEDURE — 85025 COMPLETE CBC W/AUTO DIFF WBC: CPT | Performed by: OBSTETRICS & GYNECOLOGY

## 2024-12-04 RX ORDER — OXYCODONE AND ACETAMINOPHEN 5; 325 MG/1; MG/1
1 TABLET ORAL EVERY 4 HOURS PRN
Status: DISCONTINUED | OUTPATIENT
Start: 2024-12-04 | End: 2024-12-05 | Stop reason: HOSPADM

## 2024-12-04 RX ORDER — OXYCODONE AND ACETAMINOPHEN 10; 325 MG/1; MG/1
1 TABLET ORAL EVERY 4 HOURS PRN
Status: DISCONTINUED | OUTPATIENT
Start: 2024-12-04 | End: 2024-12-05 | Stop reason: HOSPADM

## 2024-12-04 RX ORDER — BUPROPION HYDROCHLORIDE 150 MG/1
150 TABLET ORAL DAILY
Status: DISCONTINUED | OUTPATIENT
Start: 2024-12-04 | End: 2024-12-05 | Stop reason: HOSPADM

## 2024-12-04 RX ORDER — PROPOFOL 10 MG/ML
VIAL (ML) INTRAVENOUS
Status: DISCONTINUED | OUTPATIENT
Start: 2024-12-04 | End: 2024-12-04

## 2024-12-04 RX ORDER — HYDROMORPHONE HYDROCHLORIDE 2 MG/ML
1 INJECTION, SOLUTION INTRAMUSCULAR; INTRAVENOUS; SUBCUTANEOUS EVERY 6 HOURS PRN
Status: DISCONTINUED | OUTPATIENT
Start: 2024-12-04 | End: 2024-12-05 | Stop reason: HOSPADM

## 2024-12-04 RX ORDER — IPRATROPIUM BROMIDE AND ALBUTEROL SULFATE 2.5; .5 MG/3ML; MG/3ML
3 SOLUTION RESPIRATORY (INHALATION) ONCE AS NEEDED
Status: DISCONTINUED | OUTPATIENT
Start: 2024-12-04 | End: 2024-12-04 | Stop reason: HOSPADM

## 2024-12-04 RX ORDER — ACETAMINOPHEN 325 MG/1
650 TABLET ORAL EVERY 4 HOURS PRN
Status: DISCONTINUED | OUTPATIENT
Start: 2024-12-04 | End: 2024-12-05 | Stop reason: HOSPADM

## 2024-12-04 RX ORDER — DIPHENHYDRAMINE HCL 25 MG
25 CAPSULE ORAL EVERY 4 HOURS PRN
Status: DISCONTINUED | OUTPATIENT
Start: 2024-12-04 | End: 2024-12-05 | Stop reason: HOSPADM

## 2024-12-04 RX ORDER — MEPERIDINE HYDROCHLORIDE 25 MG/ML
25 INJECTION INTRAMUSCULAR; INTRAVENOUS; SUBCUTANEOUS ONCE AS NEEDED
Status: COMPLETED | OUTPATIENT
Start: 2024-12-04 | End: 2024-12-04

## 2024-12-04 RX ORDER — DIPHENHYDRAMINE HYDROCHLORIDE 50 MG/ML
25 INJECTION INTRAMUSCULAR; INTRAVENOUS EVERY 6 HOURS PRN
Status: DISCONTINUED | OUTPATIENT
Start: 2024-12-04 | End: 2024-12-04 | Stop reason: HOSPADM

## 2024-12-04 RX ORDER — SIMETHICONE 80 MG
80 TABLET,CHEWABLE ORAL EVERY 4 HOURS PRN
Status: DISCONTINUED | OUTPATIENT
Start: 2024-12-04 | End: 2024-12-05 | Stop reason: HOSPADM

## 2024-12-04 RX ORDER — LIDOCAINE HYDROCHLORIDE 20 MG/ML
INJECTION, SOLUTION EPIDURAL; INFILTRATION; INTRACAUDAL; PERINEURAL
Status: DISCONTINUED | OUTPATIENT
Start: 2024-12-04 | End: 2024-12-04

## 2024-12-04 RX ORDER — HYDROMORPHONE HYDROCHLORIDE 2 MG/ML
INJECTION, SOLUTION INTRAMUSCULAR; INTRAVENOUS; SUBCUTANEOUS
Status: DISCONTINUED | OUTPATIENT
Start: 2024-12-04 | End: 2024-12-04

## 2024-12-04 RX ORDER — ONDANSETRON HYDROCHLORIDE 2 MG/ML
INJECTION, SOLUTION INTRAVENOUS
Status: DISCONTINUED | OUTPATIENT
Start: 2024-12-04 | End: 2024-12-04

## 2024-12-04 RX ORDER — SODIUM CHLORIDE 9 MG/ML
INJECTION, SOLUTION INTRAVENOUS CONTINUOUS
Status: DISCONTINUED | OUTPATIENT
Start: 2024-12-04 | End: 2024-12-04

## 2024-12-04 RX ORDER — ONDANSETRON HYDROCHLORIDE 2 MG/ML
4 INJECTION, SOLUTION INTRAVENOUS DAILY PRN
Status: DISCONTINUED | OUTPATIENT
Start: 2024-12-04 | End: 2024-12-04 | Stop reason: HOSPADM

## 2024-12-04 RX ORDER — DEXAMETHASONE SODIUM PHOSPHATE 4 MG/ML
INJECTION, SOLUTION INTRA-ARTICULAR; INTRALESIONAL; INTRAMUSCULAR; INTRAVENOUS; SOFT TISSUE
Status: DISCONTINUED | OUTPATIENT
Start: 2024-12-04 | End: 2024-12-04

## 2024-12-04 RX ORDER — KETOROLAC TROMETHAMINE 30 MG/ML
30 INJECTION, SOLUTION INTRAMUSCULAR; INTRAVENOUS EVERY 6 HOURS
Status: DISPENSED | OUTPATIENT
Start: 2024-12-04 | End: 2024-12-05

## 2024-12-04 RX ORDER — CEFAZOLIN 2 G/1
2 INJECTION, POWDER, FOR SOLUTION INTRAMUSCULAR; INTRAVENOUS
Status: COMPLETED | OUTPATIENT
Start: 2024-12-04 | End: 2024-12-04

## 2024-12-04 RX ORDER — KETOROLAC TROMETHAMINE 30 MG/ML
INJECTION, SOLUTION INTRAMUSCULAR; INTRAVENOUS
Status: DISCONTINUED | OUTPATIENT
Start: 2024-12-04 | End: 2024-12-04

## 2024-12-04 RX ORDER — PHENAZOPYRIDINE HYDROCHLORIDE 100 MG/1
200 TABLET, FILM COATED ORAL
Status: COMPLETED | OUTPATIENT
Start: 2024-12-04 | End: 2024-12-04

## 2024-12-04 RX ORDER — IBUPROFEN 400 MG/1
800 TABLET ORAL EVERY 8 HOURS
Status: DISCONTINUED | OUTPATIENT
Start: 2024-12-05 | End: 2024-12-05 | Stop reason: HOSPADM

## 2024-12-04 RX ORDER — GLUCAGON 1 MG
1 KIT INJECTION
Status: DISCONTINUED | OUTPATIENT
Start: 2024-12-04 | End: 2024-12-04 | Stop reason: HOSPADM

## 2024-12-04 RX ORDER — SODIUM CHLORIDE, SODIUM LACTATE, POTASSIUM CHLORIDE, CALCIUM CHLORIDE 600; 310; 30; 20 MG/100ML; MG/100ML; MG/100ML; MG/100ML
INJECTION, SOLUTION INTRAVENOUS CONTINUOUS
Status: DISCONTINUED | OUTPATIENT
Start: 2024-12-04 | End: 2024-12-05 | Stop reason: HOSPADM

## 2024-12-04 RX ORDER — MORPHINE SULFATE 10 MG/ML
4 INJECTION INTRAMUSCULAR; INTRAVENOUS; SUBCUTANEOUS EVERY 5 MIN PRN
Status: DISCONTINUED | OUTPATIENT
Start: 2024-12-04 | End: 2024-12-04 | Stop reason: HOSPADM

## 2024-12-04 RX ORDER — BUPIVACAINE HYDROCHLORIDE 2.5 MG/ML
INJECTION, SOLUTION EPIDURAL; INFILTRATION; INTRACAUDAL
Status: DISCONTINUED | OUTPATIENT
Start: 2024-12-04 | End: 2024-12-04 | Stop reason: HOSPADM

## 2024-12-04 RX ORDER — FAMOTIDINE 20 MG/1
20 TABLET, FILM COATED ORAL
Status: COMPLETED | OUTPATIENT
Start: 2024-12-04 | End: 2024-12-04

## 2024-12-04 RX ORDER — ROCURONIUM BROMIDE 10 MG/ML
INJECTION, SOLUTION INTRAVENOUS
Status: DISCONTINUED | OUTPATIENT
Start: 2024-12-04 | End: 2024-12-04

## 2024-12-04 RX ORDER — HYDROMORPHONE HYDROCHLORIDE 2 MG/ML
0.5 INJECTION, SOLUTION INTRAMUSCULAR; INTRAVENOUS; SUBCUTANEOUS EVERY 5 MIN PRN
Status: DISCONTINUED | OUTPATIENT
Start: 2024-12-04 | End: 2024-12-04 | Stop reason: HOSPADM

## 2024-12-04 RX ORDER — MIDAZOLAM HYDROCHLORIDE 1 MG/ML
INJECTION INTRAMUSCULAR; INTRAVENOUS
Status: DISCONTINUED | OUTPATIENT
Start: 2024-12-04 | End: 2024-12-04

## 2024-12-04 RX ORDER — PHENYLEPHRINE HYDROCHLORIDE 10 MG/ML
INJECTION INTRAVENOUS
Status: DISCONTINUED | OUTPATIENT
Start: 2024-12-04 | End: 2024-12-04

## 2024-12-04 RX ORDER — ONDANSETRON 4 MG/1
8 TABLET, ORALLY DISINTEGRATING ORAL EVERY 8 HOURS PRN
Status: DISCONTINUED | OUTPATIENT
Start: 2024-12-04 | End: 2024-12-05 | Stop reason: HOSPADM

## 2024-12-04 RX ORDER — FENTANYL CITRATE 50 UG/ML
INJECTION, SOLUTION INTRAMUSCULAR; INTRAVENOUS
Status: DISCONTINUED | OUTPATIENT
Start: 2024-12-04 | End: 2024-12-04

## 2024-12-04 RX ORDER — MUPIROCIN 20 MG/G
1 OINTMENT TOPICAL 2 TIMES DAILY
Status: DISCONTINUED | OUTPATIENT
Start: 2024-12-04 | End: 2024-12-05 | Stop reason: HOSPADM

## 2024-12-04 RX ORDER — CITALOPRAM 20 MG/1
40 TABLET, FILM COATED ORAL DAILY
Status: DISCONTINUED | OUTPATIENT
Start: 2024-12-04 | End: 2024-12-05 | Stop reason: HOSPADM

## 2024-12-04 RX ORDER — GLYCOPYRROLATE 0.2 MG/ML
INJECTION INTRAMUSCULAR; INTRAVENOUS
Status: DISCONTINUED | OUTPATIENT
Start: 2024-12-04 | End: 2024-12-04

## 2024-12-04 RX ORDER — MUPIROCIN 20 MG/G
OINTMENT TOPICAL
Status: DISCONTINUED | OUTPATIENT
Start: 2024-12-04 | End: 2024-12-04 | Stop reason: HOSPADM

## 2024-12-04 RX ADMIN — MORPHINE SULFATE 4 MG: 10 INJECTION INTRAVENOUS at 12:12

## 2024-12-04 RX ADMIN — ROCURONIUM BROMIDE 10 MG: 10 INJECTION, SOLUTION INTRAVENOUS at 09:12

## 2024-12-04 RX ADMIN — OXYCODONE AND ACETAMINOPHEN 1 TABLET: 10; 325 TABLET ORAL at 02:12

## 2024-12-04 RX ADMIN — SUGAMMADEX 50 MG: 100 INJECTION, SOLUTION INTRAVENOUS at 10:12

## 2024-12-04 RX ADMIN — HYDROMORPHONE HYDROCHLORIDE 0.5 MG: 2 INJECTION, SOLUTION INTRAMUSCULAR; INTRAVENOUS; SUBCUTANEOUS at 11:12

## 2024-12-04 RX ADMIN — KETOROLAC TROMETHAMINE 30 MG: 30 INJECTION, SOLUTION INTRAMUSCULAR; INTRAVENOUS at 05:12

## 2024-12-04 RX ADMIN — OXYCODONE AND ACETAMINOPHEN 1 TABLET: 10; 325 TABLET ORAL at 10:12

## 2024-12-04 RX ADMIN — PHENAZOPYRIDINE 200 MG: 100 TABLET ORAL at 06:12

## 2024-12-04 RX ADMIN — DEXAMETHASONE SODIUM PHOSPHATE 8 MG: 4 INJECTION, SOLUTION INTRA-ARTICULAR; INTRALESIONAL; INTRAMUSCULAR; INTRAVENOUS; SOFT TISSUE at 07:12

## 2024-12-04 RX ADMIN — ROCURONIUM BROMIDE 10 MG: 10 INJECTION, SOLUTION INTRAVENOUS at 08:12

## 2024-12-04 RX ADMIN — ONDANSETRON 4 MG: 2 INJECTION INTRAMUSCULAR; INTRAVENOUS at 07:12

## 2024-12-04 RX ADMIN — OXYCODONE AND ACETAMINOPHEN 1 TABLET: 10; 325 TABLET ORAL at 06:12

## 2024-12-04 RX ADMIN — BUPROPION HYDROCHLORIDE 150 MG: 150 TABLET, EXTENDED RELEASE ORAL at 02:12

## 2024-12-04 RX ADMIN — SIMETHICONE 80 MG: 80 TABLET, CHEWABLE ORAL at 11:12

## 2024-12-04 RX ADMIN — SODIUM CHLORIDE: 9 INJECTION, SOLUTION INTRAVENOUS at 07:12

## 2024-12-04 RX ADMIN — KETOROLAC TROMETHAMINE 30 MG: 30 INJECTION, SOLUTION INTRAMUSCULAR at 11:12

## 2024-12-04 RX ADMIN — CEFAZOLIN 2 G: 2 INJECTION, POWDER, FOR SOLUTION INTRAMUSCULAR; INTRAVENOUS at 07:12

## 2024-12-04 RX ADMIN — PROPOFOL 150 MG: 10 INJECTION, EMULSION INTRAVENOUS at 07:12

## 2024-12-04 RX ADMIN — MIDAZOLAM HYDROCHLORIDE 2 MG: 1 INJECTION, SOLUTION INTRAMUSCULAR; INTRAVENOUS at 07:12

## 2024-12-04 RX ADMIN — ROCURONIUM BROMIDE 50 MG: 10 INJECTION, SOLUTION INTRAVENOUS at 07:12

## 2024-12-04 RX ADMIN — MEPERIDINE HYDROCHLORIDE 25 MG: 25 INJECTION INTRAMUSCULAR; INTRAVENOUS; SUBCUTANEOUS at 12:12

## 2024-12-04 RX ADMIN — KETOROLAC TROMETHAMINE 30 MG: 30 INJECTION, SOLUTION INTRAMUSCULAR; INTRAVENOUS at 02:12

## 2024-12-04 RX ADMIN — MUPIROCIN 1 G: 20 OINTMENT TOPICAL at 09:12

## 2024-12-04 RX ADMIN — LIDOCAINE HYDROCHLORIDE 80 MG: 20 INJECTION, SOLUTION EPIDURAL; INFILTRATION; INTRACAUDAL; PERINEURAL at 07:12

## 2024-12-04 RX ADMIN — GLYCOPYRROLATE 0.4 MG: 0.2 INJECTION INTRAMUSCULAR; INTRAVENOUS at 08:12

## 2024-12-04 RX ADMIN — SODIUM CHLORIDE, POTASSIUM CHLORIDE, SODIUM LACTATE AND CALCIUM CHLORIDE: 600; 310; 30; 20 INJECTION, SOLUTION INTRAVENOUS at 12:12

## 2024-12-04 RX ADMIN — FAMOTIDINE 20 MG: 20 TABLET, FILM COATED ORAL at 06:12

## 2024-12-04 RX ADMIN — PHENYLEPHRINE HYDROCHLORIDE 100 MCG: 10 INJECTION INTRAVENOUS at 08:12

## 2024-12-04 RX ADMIN — HYDROMORPHONE HYDROCHLORIDE 0.5 MG: 2 INJECTION, SOLUTION INTRAMUSCULAR; INTRAVENOUS; SUBCUTANEOUS at 08:12

## 2024-12-04 RX ADMIN — CITALOPRAM HYDROBROMIDE 40 MG: 20 TABLET ORAL at 02:12

## 2024-12-04 RX ADMIN — FENTANYL CITRATE 100 MCG: 50 INJECTION, SOLUTION INTRAMUSCULAR; INTRAVENOUS at 07:12

## 2024-12-04 RX ADMIN — SUGAMMADEX 150 MG: 100 INJECTION, SOLUTION INTRAVENOUS at 10:12

## 2024-12-04 NOTE — TRANSFER OF CARE
"Anesthesia Transfer of Care Note    Patient: Elisabeth Ford    Procedure(s) Performed: Procedure(s) (LRB):  XI ROBOTIC HYSTERECTOMY,WITH SALPINGECTOMY (Bilateral)  CYSTOSCOPY (N/A)  XI ROBOTIC LYSIS, ADHESIONS  XI ROBOTIC LYSIS, ADHESIONS    Patient location: PACU    Anesthesia Type: general    Transport from OR: Transported from OR on 6-10 L/min O2 by face mask with adequate spontaneous ventilation    Post pain: adequate analgesia    Post assessment: no apparent anesthetic complications    Post vital signs: stable    Level of consciousness: sedated and responds to stimulation    Nausea/Vomiting: no nausea/vomiting    Complications: none    Transfer of care protocol was followed      Last vitals: Visit Vitals  /70 (BP Location: Right arm, Patient Position: Lying)   Pulse 71   Temp (!) 35.2 °C (95.4 °F)   Resp 14   Ht 5' 6" (1.676 m)   Wt 68.9 kg (152 lb)   LMP 11/02/2024   SpO2 100%   Breastfeeding No   BMI 24.53 kg/m²     "

## 2024-12-04 NOTE — H&P (VIEW-ONLY)
Gynecology History and Physical    Assessment/Plan:   Problem List Items Addressed This Visit    None  Visit Diagnoses       Pre-op testing    -  Primary    Relevant Orders    POCT urine pregnancy (Completed)    Menorrhagia with regular cycle        Relevant Orders    Surgical Pathology (Completed)    Pain associated with surgical procedure        Relevant Medications    ketorolac injection 60 mg (Completed)    Dysmenorrhea        Irregular menses        Pelvic pain        Dyspareunia due to medical condition in female              She had an EMB today. Results were benign.    Plan for a total robotic hysterectomy with bilateral salpingectomy and cystoscopy on 2024. Discussed that there is a chance that it may need to be converted to a total abdominal hysterectomy.   Discussed the benefits of resolution of menses and pain associated with menses. Discussed risks of bleeding, infection, damage to adjacent organs, risks of anesthesia. Discussed the risk of bladder, ureter, or bowel injury. Discussed that if a bladder injury occurs, then she would need a Cavazos catheter for 7-10 days. Discussed that if a ureteral injury occurs, then urology would need to manage which could require a stent, nephrostomy tube placement, and/or reimplantation procedure. Discussed if bowel injury occurs, then general anesthesia would be consulted and may need bowel resection or colostomy. She verbalized her understanding. She agrees to proceed. All questions answered. Consents reviewed and signed.     Discussed that with her h/o 4 previous  sections--she does have an increased risk for bladder injury. She verbalized her understanding of this.    NPO status and arrival time discussed.    She desires to stay overnight after the surgery.    F/u for postop visit 1 week after the surgery.    CC:   Chief Complaint   Patient presents with    Procedure     HPI: Elisabeth Ford is a 39 y.o. female who presents with complaints of  painful,  irregular cycles that last 2 weeks with heavy bleeding. Currently c/o pain on left side of abdomen.      Patient is requesting hysterectomy resolve her chronic problems.     Her last menstrual period was 2024.  Menses was 4 days.  Patient wears tampons for control.  Patient reports flooding through tampons.  She can not wear perineal pads due to allergy the perineal pads.     She does complain of some intermittent hot flash symptomatology.     Patient believes she had laparoscopy for the ectopic pregnancy on the right.  Patient believes procedure was performed by Dr. Elias Hernandez at [facility].  Subsequently to the ectopic pregnancy resection, she did have 2 additional children.  All 4 children were delivered by  section.     Patient reports that Dr. Elias Hernandez delivered all her children.  Patient's youngest child is 17 years of age and the patient has had subsequent to her last  section no contraception.  Patient believes that Dr. Hernandez did not perform a tubal sterilization with the last  section.     Patient denies any galactorrhea.  She was complain of mastodynia.  Patient has never had a mammogram.  Patient has a negative history of breast carcinoma in the family.      2024 PAP was negative.    US on 10/21/2024:    FINDINGS:  The uterus measures 9.0 x 5.1 x 6.4 cm.  The endometrium measures 4.4 mm.  There is an intramural fibroid in the left anterior uterus measuring 2.5 x 2.4 x 2.7 cm.  Nabothian cysts are again seen.     The right ovary measures 2.6 x 1.1 x 1.7 cm.  The left ovary measures 3.1 x 2.3 x 2.5 cm.  It contains a 1.6 cm cyst/follicle.  No free fluid is present in the cul-de-sac.     Impression:     There is a 2.5 cm intramural fibroid.  No other significant findings are seen.    2024 EMB: Benign proliferative endometrium.    On 2024 she did have a CT of the abdomen and pelvis due to severe pain in the ER and had a 15 mm renal cyst. Renal US  recommended.      Review of Systems: The following ROS was otherwise negative, except as noted in the HPI:  constitutional, HEENT, respiratory, cardiovascular, gastrointestinal, genitourinary, skin, musculoskeletal, neurological, psych    Gynecologic History:   No history of abnormal pap smears  No history of of STIs  Menstrual history:       Obstetrical History:  OB History          5    Para   4    Term   4            AB   1    Living   2         SAB        IAB        Ectopic   1    Multiple        Live Births   4           Obstetric Comments   1 ectopic pregnancy               Past Medical History:   History reviewed. No pertinent past medical history.    Medications:  Medication List with Changes/Refills   New Medications    IBUPROFEN (ADVIL,MOTRIN) 800 MG TABLET    Take 1 tablet (800 mg total) by mouth every 8 (eight) hours as needed for Pain.    NORETHINDRONE (AYGESTIN) 5 MG TAB    Take 1 tablet (5 mg total) by mouth 2 (two) times a day.   Current Medications    BUPROPION (WELLBUTRIN XL) 150 MG TB24 TABLET    Take 1 tablet (150 mg total) by mouth once daily.    CITALOPRAM (CELEXA) 40 MG TABLET    Take 1 tablet (40 mg total) by mouth once daily.    CLORAZEPATE (TRANXENE) 7.5 MG TAB    Take 1 tablet (7.5 mg total) by mouth 2 (two) times daily as needed (anxiety).    CYCLOBENZAPRINE (FLEXERIL) 10 MG TABLET    Take 1 tablet (10 mg total) by mouth 3 (three) times daily as needed for Muscle spasms.    HYDROCODONE-ACETAMINOPHEN (NORCO) 5-325 MG PER TABLET    Take 1 tablet by mouth every 6 (six) hours as needed for Pain.    -PROPYLENE GLYCOL, PF, (SYSTANE, PF,) 0.4-0.3 % DPET    Apply to eye.    PRAMIPEXOLE (MIRAPEX) 0.125 MG TABLET    Take 1 tablet (0.125 mg total) by mouth 3 (three) times daily.    ROPINIROLE (REQUIP) 0.5 MG TABLET    Take 0.5 mg by mouth 2 (two) times a day.    TRAZODONE (DESYREL) 100 MG TABLET    Take 1 tablet (100 mg total) by mouth nightly as needed for Insomnia.   Changed  "and/or Refilled Medications    Modified Medication Previous Medication    MISOPROSTOL (CYTOTEC) 200 MCG TAB miSOPROStoL (CYTOTEC) 200 MCG Tab       Take 2 tablets (400 mcg total) by mouth once. Take the night before the procedure. for 1 dose    Take 2 tablets (400 mcg total) by mouth once. Take the night before the procedure. for 1 dose       Allergies:  Phenergan [promethazine]    Surgical History:  Past Surgical History:   Procedure Laterality Date     SECTION         Family History:  No family history on file.    Social History:  Social History     Substance and Sexual Activity   Alcohol Use Not Currently     Social History     Substance and Sexual Activity   Drug Use Never     Social History     Tobacco Use   Smoking Status Some Days    Types: Vaping with nicotine   Smokeless Tobacco Never       Physical Exam:  /79   Pulse 78   Ht 5' 6" (1.676 m)   Wt 66.6 kg (146 lb 12.8 oz)   LMP 2024   SpO2 96%   BMI 23.69 kg/m²     General: Alert, well appearing, no acute distress  Head: Normocephalic, atraumatic  Lungs: Unlabored respirations. Clear to auscultation bilaterally.  Cardiovascular: Regular rate and rhythm.   Abdomen: Soft, nontender, nondistended   Pelvic:  Exam chaperoned by female assistant.   External: normal female genitalia, no masses or lesions   Vagina: moist, pink mucosa with rugae, physiologic discharge  Cervix: no masses or lesions, nontender  Uterus: approx 9 weeks, mobile, midline, nontender  Adnexa: no masses or fullness, nontender  Rectovaginal: deferred  Extremities: No redness or tenderness  Skin: Well perfused, normal coloration and turgor, no lesions or rashes visualized  Neuro: Alert, oriented, normal speech, no focal deficits, moves extremities appropriately  Psych: Normal mood and behavior.    Labs:  Procedure visit on 2024   Component Date Value    Case Report 2024                      Value:Surgical Pathology                                Case: " [id number]                                   Authorizing Provider:  Aleks Quiroz MD         Collected:           11/06/2024 04:34 PM          Ordering Location:     [facility] Received:            11/07/2024 08:30 AM                                 - Obstetrics And                                                                                    Gynecology                                                                   Pathologist:           Boogie Ocampo MD                                                           Specimen:    Endometrium                                                                                Final Diagnosis 11/06/2024                      Value:A. Endometrium, biopsy:  Benign proliferative endometrium, blood, inflamed mucous, and benign endocervix/ endocervical epithelium      Gross Description 11/06/2024                      Value:A. Endometrium:   The specimen is received in formalin designated endometrium and consists of multiple shaggy red-brown tissue fragments and clear mucoid material that measures 2.0 x 3.0 cm in aggregate and is entirely submitted cassette A1.    Grossing was completed by Mac Sousa.      Microscopic Description 11/06/2024                      Value:A microscopic examination was performed and the diagnosis reflects the findings.          Clinical Information 11/06/2024                      Value:Menorrhagia and irregular cycle and uterine leiomyoma    Laboratory Notes 11/06/2024                      Value:If this report includes immunohistochemical (IHC) test results, please note the following: IHC studies were interpreted in conjunction with appropriate positive and negative controls which demonstrate the expected positive and negative reactivity. This laboratory is regulated under CLIA as qualified to perform high-complexity testing. IHC tests are used for clinical purposes. They should not be regarded as investigational or  research.        POC Preg Test, Ur 11/06/2024 Negative      Acceptab* 11/06/2024 Yes        Aleks Quiroz MD

## 2024-12-04 NOTE — DISCHARGE INSTRUCTIONS
May shower. No tub bath. Pelvic rest - no tampons, no douching, no vaginal intercourse  Pelvic Rest. NO intercourse, no douching, no sitting in water, no tampons.  No lifting more than 10 lbs,  No driving, operating heavy equipment, or signing an legal documents.  Report excessive bleeding to Your physician immediately.  Take meds as directed.  Call your physician for and complaints or concerns.    *Notify your healthcare provider if you experience any of the following: temperature >100.4  *Notify your healthcare provider if you experience any of the following: redness, tenderness, or any signs or symptoms of infection (pain, swelling, redness, odor or green/yellow drainage around incision site).  *Notify your healthcare provider if you experience any of the following: difficulty breathing or increased cough.  *Notify your physician if you experience any persistent nausea, vomiting, diarrhea or headache.  *Notify your physician if you experience any of the following: severe uncontrolled pain, worsening rash, increased confusion, weakness, dizziness, lightheadedness, or visual disturbances.

## 2024-12-04 NOTE — INTERVAL H&P NOTE
The patient has been examined and the H&P has been reviewed:    I concur with the findings and no changes have occurred since H&P was written.    Anesthesia/Surgery risks, benefits and alternative options discussed and understood by patient/family.          Active Hospital Problems    Diagnosis  POA    *Menorrhagia with irregular cycle [N92.1]  Yes    Pelvic pain [R10.2]  Yes    Dyspareunia due to medical condition in female [N94.19]  Yes    Intramural leiomyoma of uterus [D25.1]  Yes    Dysmenorrhea [N94.6]  Yes      Resolved Hospital Problems   No resolved problems to display.

## 2024-12-04 NOTE — OP NOTE
[facility] - Periop Services  Brief Operative Note     SUMMARY      Surgery Date: 12/4/2024      Surgeons and Role:  Panel 1:     * Aleks Quiroz MD - Primary  Panel 2:     * Zoran Mendoza MD - Primary     Assisting Surgeon: None     Pre-op Diagnosis:  Dysmenorrhea [N94.6]  Menorrhagia with irregular cycle [N92.1]  Dyspareunia due to medical condition in female [N94.19]  Chronic right lower quadrant pain [R10.31, G89.29]  Pelvic pain [R10.2]     Post-op Diagnosis:  Post-Op Diagnosis Codes:     * Dysmenorrhea [N94.6]     * Menorrhagia with irregular cycle [N92.1]     * Dyspareunia due to medical condition in female [N94.19]     * Chronic right lower quadrant pain [R10.31, G89.29]     * Pelvic pain [R10.2]     * Female pelvic peritoneal adhesions [N73.6]     Procedure(s) (LRB):  XI ROBOTIC HYSTERECTOMY,WITH SALPINGECTOMY (Bilateral)  CYSTOSCOPY (N/A)  XI ROBOTIC LYSIS, ADHESIONS  XI ROBOTIC LYSIS, ADHESIONS     Anesthesia: General     Implants:  * No implants in log *     Operative Findings: Moderate omental adhesions to the anterior abdominal wall. Severe adhesions of the bowel to right and left adnexa that was taken down by Dr. Mendoza. Also thick bladder adhesions to the anterior uterus. Right ovary was adhesed to the right abdominal side wall.   Patient's right fallopian tube was surgically absent.     Estimated Blood Loss: 20 mL     Estimated Blood Loss has been documented.         Specimens: Cervix, uterus, left fallopian tube (one portion connected to the uterus and the   Specimen (24h ago, onward)          Start     Ordered     12/04/24 0925   Surgical Pathology  RELEASE UPON ORDERING         12/04/24 0925                 Procedure description:      The patient was taken to the operating room with IV fluids running and pneumatic compression stockings applied to the lower extremities and turned on prior to the beginning of the procedure. General anesthesia was then obtained without difficulty. The  patient was then placed in the dorsal lithotomy position with Leif type stirrups. Her arms were tucked bilaterally. Exam under anesthesia revealed the findings as above. The patient was then prepped and draped. A time-out was then performed with Dr. Quiroz present. Preoperative antibiotics were confirmed.    A Cavazos catheter was placed in the bladder and inflated with 10 mL of sterile saline. A weighted speculum was placed in the vagina and the anterior lip of the cervix was grasped with a single-tooth tenaculum. Two stitches were placed with Vicryl at 10 and 2 o'clock position to be used for retraction. The uterus was sounded to 7 cm. An 3.5 uterine manipulator was placed. Sterile saline was placed in the uterine balloon and then saline placed in the vaginal balloon. This was covered with sterile towel. Attention was then turned to the abdomen.    The supraumbilical area was anesthetized with Marcaine with epinephrine. A vertical skin incision was made with a scalpel. An 5 mm trocar was placed in the intraabdominal cavity via Optiview technique. Pneumoperitoneum was achieved with CO2 gas, and the above findings were noted. The patient was placed in extreme Trendelenburg position. The decision was made to do a 3-port robotic hysterectomy at this time. Two additional robotic ports were placed approximately 12-15 cm lateral to the umbilicus. The skin was anesthetized with Marcaine with epinephrine. A peritoneal bleb was noted on the anterior aspect of the abdomen. The skin was incised with a scalpel and two 8 mm robotic trocars were placed lateral to midline.  After that, one 5 mm trocar was then inserted just superiorly and lateral to the infraumbilical port to use for surgical assistance. The 5 mm trocar was replaced with a robotic 8 mm trocar. All of these trocars were inserted under direct visualization.  At this point the findings were noted as described above. The omental adhesions to the anterior abdominal wall  were taken down with the Ligasure. Excellent hemostasis noted.     The patient side cart was docked on the patient's left side. A bipolar forceps and a vessel sealer were introduced into the abdominal cavity under direct laparoscopic visualization. The instruments were placed next to the uterus. Attention was then turned to the surgeon's console.    Then attention was turned to the left adnexa where some filmy omental adhesions were taken down with the vessel sealer. Then Dr. Mendoza was called to help take down the bowel adhesions as described. He took down those adhesions with the vessel sealer and bipolar fenestrated forceps. See op note.    Starting on the right side, the ovary was grasped with a grasper and retracted to the opposite side. The ureter was identified and found to be well away from the operative field. The  right tube was surgically absent. The left fallopian tube segment was carried out to the fimbriae and the mesosalpinx was then serially transected using the vessel sealer to the cornu. (She had a previous tubal ligation so the distal segment was removed separately and the proximal segment was connected to the uterus was removed with the specimen.) Then the vessel sealer was replaced with the monopolar scissors. The bipolar forceps and monopolar scissors were then used to sequentially clamp, coagulate and cut the broad ligament, heading towards the round ligament. The round ligaments bilaterally were not well identified and were taken down with adhesions. The utero-ovarian ligament was then serially clamped, coagulated and transected. This was repeated on the opposite side. The thick bladder adhesions were taken down with coagulation and cutting in layers. Then the bladder adhesions were taken down until the anatomy was restored. The anterior leaf of the peritoneum was dissected down to the level of the vesicouterine peritoneum with care to avoid vital structures. The posterior peritoneum was then  dissected inferiorly. The vesicouterine peritoneum was then opened using monopolar scissors and the bladder was dissected off the lower uterine segment and upper vagina.     The uterine vessels were skeletonized using the monopolar scissors and bipolar forceps. The bipolar foceps and monopolar scissors were then used to coagulate and cut the uterine vessels. Theses were found to be hemostatic.   Using the NINI device and pushing the uterus into the pelvis, the cervicovaginal junction was delineated by the colpotomy ring.  The colpotomy was then made starting on the posterior aspect of the cervicovaginal junction, and coming around anteriorly, following the cup as a guide. The vaginal angles were coagulated using the bipolar forceps while making the colpotomy laterally.    Once the uterus was completely detached, the uterus, cervix, and bilateral tubes were successfully removed from the abdomen. The vaginal cuff was then closed using 2-0 V-Loc suture in a running fashion. The pelvis was thoroughly irrigated and excellent hemostasis was noted in the operative field. Floseal placed over the cuff.    The robot was then undocked and the patient was taken out steep Trendelenburg.     A cystoscopy was then performed and the bladder was found to be free of injury. Bilateral ureteral jets were visualized.    Skin incisions were then closed in a subcuticular fashion using 4-0 monocryl suture. Then incisions were covered with Dermabond. The umbilical incision also had a vertical mattress suture placed for hemostasis.    The patient tolerated the procedure well. All the counts were correct x2. The patient was taken to the recovery room in stable condition.

## 2024-12-04 NOTE — ANESTHESIA PROCEDURE NOTES
Intubation    Date/Time: 12/4/2024 7:52 AM    Performed by: Mela Medley CRNA  Authorized by: Sergio Pascal DO    Intubation:     Induction:  Intravenous    Intubated:  Postinduction    Mask Ventilation:  Easy mask    Attempts:  1    Attempted By:  CRNA    Method of Intubation:  Direct    Blade:  César 3    Laryngeal View Grade: Grade I - full view of cords      Difficult Airway Encountered?: No      Complications:  None    Airway Device:  Oral endotracheal tube    Airway Device Size:  7.0    Style/Cuff Inflation:  Cuffed    Inflation Amount (mL):  7    Tube secured:  20    Secured at:  The lips    Placement Verified By:  Capnometry    Complicating Factors:  None    Findings Post-Intubation:  BS equal bilateral and atraumatic/condition of teeth unchanged

## 2024-12-04 NOTE — ANESTHESIA POSTPROCEDURE EVALUATION
Anesthesia Post Evaluation    Patient: Elisabeth Ford    Procedure(s) Performed: Procedure(s) (LRB):  XI ROBOTIC HYSTERECTOMY,WITH SALPINGECTOMY (Bilateral)  CYSTOSCOPY (N/A)  XI ROBOTIC LYSIS, ADHESIONS  XI ROBOTIC LYSIS, ADHESIONS    Final Anesthesia Type: general      Patient location during evaluation: PACU  Patient participation: Yes- Able to Participate  Level of consciousness: awake and alert and oriented  Post-procedure vital signs: reviewed and stable  Pain management: adequate  Airway patency: patent  NIKKI mitigation strategies: Multimodal analgesia  PONV status at discharge: No PONV  Anesthetic complications: no      Cardiovascular status: hemodynamically stable  Respiratory status: unassisted and spontaneous ventilation  Hydration status: euvolemic  Follow-up not needed.              Vitals Value Taken Time   /70 12/04/24 1255   Temp 36.4 °C (97.5 °F) 12/04/24 1240   Pulse 71 12/04/24 1255   Resp 14 12/04/24 1255   SpO2 94 % 12/04/24 1255         Event Time   Out of Recovery 12:55:00         Pain/Jess Score: Pain Rating Prior to Med Admin: 8 (12/4/2024 12:38 PM)  Jess Score: 10 (12/4/2024 12:45 PM)

## 2024-12-04 NOTE — PROGRESS NOTES
1125 RECEIVED TO RR SEDATED. ORAL AIRWAY IN PLACE.  COLOR PINK. RESP. SHALLOW. O2 VIA FM. HOB ELEVATED. ABDOMEN SOFT WITH OP-SITES X4 APPROXIMATED WITH DERMABOND ADHESIVE DRESSING. IPERI PAD IN PLACE. NO VAGINAL BLEEDING NOTED. I V INFUSING WELL LEFT AC 20G. CATH. SCD HPSE TO LOWER EXTREMITIES WITH PUMP IN PROGRESS. TEMP LOW, JORDYN HUGGER  BLANKET IN PROGRESS. OBSERVING CLOSELY.     1147 AROUSES TO VERBAL STIMULI, OPEN EYES THEN GOES BACK TO SLEEP.    1153 MORE AWAKE, FOLLOWS COMMANDS, ORIENTATION GIVEN. ORAL AIRWAY REMOVED. NO RESP. DISTRESS NOTED. O2 SAT % ON ROOM AIR. OBSERVING CLOSELY.     1205 TEMP 94.5 ORALLY WARMING BLANKET IN PROGRESS.      1225 SHIVERING, CRYING, C/O STOMACH HURTING. DEMEROL GIVEN IV FOR PAIN AND SHIVERING.C/O NEEDING TO URINATE. BEDPAN APPLIED.    1238 STILL C/O ABDOMINAL PAIN. MORPHINE TITRATED FOR RELIEF.    1255 TEMP 97.5. NO VAGINAL BLEEDING, ABDOMEN SOFT WITHOUT BLEEDING FROM OP-SITES. UNABLE TO VOID AT PRESENT. TRANSFERRED TO ROOM.

## 2024-12-04 NOTE — ANESTHESIA PREPROCEDURE EVALUATION
2024  Elisabeth Ford is a 39 y.o., female.      Pre-op Assessment    I have reviewed the Patient Summary Reports.     I have reviewed the Nursing Notes. I have reviewed the NPO Status.   I have reviewed the Medications.     Review of Systems  Anesthesia Hx:  No problems with previous Anesthesia             Denies Family Hx of Anesthesia complications.    Denies Personal Hx of Anesthesia complications.                    Social:  Vaping, No Alcohol Use       Hematology/Oncology:       -- Anemia:                                  Cardiovascular:  Exercise tolerance: good                                             OB/GYN/PEDS:   Dysmenorrhea [N94.6]       Menorrhagia with irregular cycle [N92.1]       Dyspareunia due to medical condition in female [N94.19]       Chronic right lower quadrant pain [R10.31, G89.29]       Pelvic pain [R10.2]              Neurological:    Neuromuscular Disease,             Peripheral Neuropathy                          Psych:  Psychiatric History anxiety depression            History reviewed. No pertinent past medical history.  Past Surgical History:   Procedure Laterality Date     SECTION           Physical Exam  General: Well nourished, Cooperative and Alert    Airway:  Mallampati: II   Mouth Opening: Normal  TM Distance: Normal  Tongue: Normal  Neck ROM: Normal ROM    Chest/Lungs:  Clear to auscultation, Normal Respiratory Rate    Heart:  Rate: Normal  Rhythm: Regular Rhythm        Chemistry        Component Value Date/Time     20240    K 3.8 20240     (H) 2024    CO2 28 2024 2200    BUN 9 20240    CREATININE 0.80 2024 2200     2024        Component Value Date/Time    CALCIUM 8.7 2024 2200    ALKPHOS 71 2024 2200    AST 20 2024 2200    ALT 18 2024 2200    BILITOT  0.4 11/08/2024 2200        Lab Results   Component Value Date    WBC 6.87 11/08/2024    HGB 10.8 (L) 11/08/2024    HCT 34.2 (L) 11/08/2024     11/08/2024     No results found for this or any previous visit.    HCG - negative    Anesthesia Plan  Type of Anesthesia, risks & benefits discussed:    Anesthesia Type: Gen ETT, Regional  Intra-op Monitoring Plan: Standard ASA Monitors  Post Op Pain Control Plan: multimodal analgesia  Induction:  IV  Airway Plan: Direct, Post-Induction  Informed Consent: Informed consent signed with the Patient and all parties understand the risks and agree with anesthesia plan.  All questions answered.   ASA Score: 2  Day of Surgery Review of History & Physical: H&P Update referred to the surgeon/provider.I have interviewed and examined the patient. I have reviewed the patient's H&P dated: There are no significant changes.     Ready For Surgery From Anesthesia Perspective.     .

## 2024-12-04 NOTE — PROCEDURES
Endometrial biopsy    Date/Time: 11/6/2024 3:00 PM    Performed by: Aleks Quiroz MD  Authorized by: Aleks Quiroz MD    Consent:     Prior to procedure the appropriate consent was completed and verified      Consent given by:  Patient    Patient questions answered: yes      Patient agrees, verbalizes understanding, and wants to proceed: yes      Educational handouts given: no      Instructions and paperwork completed: yes    Indication:     Indications: Menorrhagia    Pre-procedure:     Pre-procedure timeout performed: yes    Procedure:     Procedure: endometrial biopsy with Pipelle      Cervix cleaned and prepped: yes      A paracervical block was performed: no      An intracervical block was performed: no      The cervix was dilated using cervical dilators: yes (os finder)      Use of single-tooth tenaculum: yes (allis clamp)      Uterus sounded: yes      Uterus sound depth (cm):  8    Specimen collected: specimen collected and sent to pathology      Patient tolerated procedure well with no complications: yes    Comments:     Procedure comments:  Endometrial Biopsy Procedure Note    Indication: Menorrhagia and pelvic pain.  EBL: 2mL  Complications: None  Urine pregnancy test:  neg    The risks of the procedure were discussed with the patient.  She was aware these risks include, but are not limited to bleeding, infection and damage to surrounding tissue.  She elected to proceed with the endometrial biopsy.  Written consent was obtained.    She was placed in the dorsal lithotomy position.  A speculum was placed in the vagina, and the cervix was cleansed with betadine. An allis clamp was placed on the anterior lip of the cervix. The cervix was not dilated. An endometrial pipelle was advanced to the fundus and a sample was gently collected. Specimen was adequate after 3+ attempt(s).  The allis clamp was removed and adequate hemostasis was achieved.    She was instructed to take OTC motrin and/or tylenol for pain  control.    The pt was instructed to follow-up in 2 weeks to discuss the results of the biopsy.

## 2024-12-04 NOTE — H&P
Gynecology History and Physical    Assessment/Plan:   Problem List Items Addressed This Visit    None  Visit Diagnoses       Pre-op testing    -  Primary    Relevant Orders    POCT urine pregnancy (Completed)    Menorrhagia with regular cycle        Relevant Orders    Surgical Pathology (Completed)    Pain associated with surgical procedure        Relevant Medications    ketorolac injection 60 mg (Completed)    Dysmenorrhea        Irregular menses        Pelvic pain        Dyspareunia due to medical condition in female              She had an EMB today. Results were benign.    Plan for a total robotic hysterectomy with bilateral salpingectomy and cystoscopy on 2024. Discussed that there is a chance that it may need to be converted to a total abdominal hysterectomy.   Discussed the benefits of resolution of menses and pain associated with menses. Discussed risks of bleeding, infection, damage to adjacent organs, risks of anesthesia. Discussed the risk of bladder, ureter, or bowel injury. Discussed that if a bladder injury occurs, then she would need a Cavazos catheter for 7-10 days. Discussed that if a ureteral injury occurs, then urology would need to manage which could require a stent, nephrostomy tube placement, and/or reimplantation procedure. Discussed if bowel injury occurs, then general anesthesia would be consulted and may need bowel resection or colostomy. She verbalized her understanding. She agrees to proceed. All questions answered. Consents reviewed and signed.     Discussed that with her h/o 4 previous  sections--she does have an increased risk for bladder injury. She verbalized her understanding of this.    NPO status and arrival time discussed.    She desires to stay overnight after the surgery.    F/u for postop visit 1 week after the surgery.    CC:   Chief Complaint   Patient presents with    Procedure     HPI: Elisabeth Ford is a 39 y.o. female who presents with complaints of  painful,  irregular cycles that last 2 weeks with heavy bleeding. Currently c/o pain on left side of abdomen.      Patient is requesting hysterectomy resolve her chronic problems.     Her last menstrual period was 2024.  Menses was 4 days.  Patient wears tampons for control.  Patient reports flooding through tampons.  She can not wear perineal pads due to allergy the perineal pads.     She does complain of some intermittent hot flash symptomatology.     Patient believes she had laparoscopy for the ectopic pregnancy on the right.  Patient believes procedure was performed by Dr. Elias Hernandez at [facility].  Subsequently to the ectopic pregnancy resection, she did have 2 additional children.  All 4 children were delivered by  section.     Patient reports that Dr. Elias Hernandez delivered all her children.  Patient's youngest child is 17 years of age and the patient has had subsequent to her last  section no contraception.  Patient believes that Dr. Hernandez did not perform a tubal sterilization with the last  section.     Patient denies any galactorrhea.  She was complain of mastodynia.  Patient has never had a mammogram.  Patient has a negative history of breast carcinoma in the family.      2024 PAP was negative.    US on 10/21/2024:    FINDINGS:  The uterus measures 9.0 x 5.1 x 6.4 cm.  The endometrium measures 4.4 mm.  There is an intramural fibroid in the left anterior uterus measuring 2.5 x 2.4 x 2.7 cm.  Nabothian cysts are again seen.     The right ovary measures 2.6 x 1.1 x 1.7 cm.  The left ovary measures 3.1 x 2.3 x 2.5 cm.  It contains a 1.6 cm cyst/follicle.  No free fluid is present in the cul-de-sac.     Impression:     There is a 2.5 cm intramural fibroid.  No other significant findings are seen.    2024 EMB: Benign proliferative endometrium.    On 2024 she did have a CT of the abdomen and pelvis due to severe pain in the ER and had a 15 mm renal cyst. Renal US  recommended.      Review of Systems: The following ROS was otherwise negative, except as noted in the HPI:  constitutional, HEENT, respiratory, cardiovascular, gastrointestinal, genitourinary, skin, musculoskeletal, neurological, psych    Gynecologic History:   No history of abnormal pap smears  No history of of STIs  Menstrual history:       Obstetrical History:  OB History          5    Para   4    Term   4            AB   1    Living   2         SAB        IAB        Ectopic   1    Multiple        Live Births   4           Obstetric Comments   1 ectopic pregnancy               Past Medical History:   History reviewed. No pertinent past medical history.    Medications:  Medication List with Changes/Refills   New Medications    IBUPROFEN (ADVIL,MOTRIN) 800 MG TABLET    Take 1 tablet (800 mg total) by mouth every 8 (eight) hours as needed for Pain.    NORETHINDRONE (AYGESTIN) 5 MG TAB    Take 1 tablet (5 mg total) by mouth 2 (two) times a day.   Current Medications    BUPROPION (WELLBUTRIN XL) 150 MG TB24 TABLET    Take 1 tablet (150 mg total) by mouth once daily.    CITALOPRAM (CELEXA) 40 MG TABLET    Take 1 tablet (40 mg total) by mouth once daily.    CLORAZEPATE (TRANXENE) 7.5 MG TAB    Take 1 tablet (7.5 mg total) by mouth 2 (two) times daily as needed (anxiety).    CYCLOBENZAPRINE (FLEXERIL) 10 MG TABLET    Take 1 tablet (10 mg total) by mouth 3 (three) times daily as needed for Muscle spasms.    HYDROCODONE-ACETAMINOPHEN (NORCO) 5-325 MG PER TABLET    Take 1 tablet by mouth every 6 (six) hours as needed for Pain.    -PROPYLENE GLYCOL, PF, (SYSTANE, PF,) 0.4-0.3 % DPET    Apply to eye.    PRAMIPEXOLE (MIRAPEX) 0.125 MG TABLET    Take 1 tablet (0.125 mg total) by mouth 3 (three) times daily.    ROPINIROLE (REQUIP) 0.5 MG TABLET    Take 0.5 mg by mouth 2 (two) times a day.    TRAZODONE (DESYREL) 100 MG TABLET    Take 1 tablet (100 mg total) by mouth nightly as needed for Insomnia.   Changed  "and/or Refilled Medications    Modified Medication Previous Medication    MISOPROSTOL (CYTOTEC) 200 MCG TAB miSOPROStoL (CYTOTEC) 200 MCG Tab       Take 2 tablets (400 mcg total) by mouth once. Take the night before the procedure. for 1 dose    Take 2 tablets (400 mcg total) by mouth once. Take the night before the procedure. for 1 dose       Allergies:  Phenergan [promethazine]    Surgical History:  Past Surgical History:   Procedure Laterality Date     SECTION         Family History:  No family history on file.    Social History:  Social History     Substance and Sexual Activity   Alcohol Use Not Currently     Social History     Substance and Sexual Activity   Drug Use Never     Social History     Tobacco Use   Smoking Status Some Days    Types: Vaping with nicotine   Smokeless Tobacco Never       Physical Exam:  /79   Pulse 78   Ht 5' 6" (1.676 m)   Wt 66.6 kg (146 lb 12.8 oz)   LMP 2024   SpO2 96%   BMI 23.69 kg/m²     General: Alert, well appearing, no acute distress  Head: Normocephalic, atraumatic  Lungs: Unlabored respirations. Clear to auscultation bilaterally.  Cardiovascular: Regular rate and rhythm.   Abdomen: Soft, nontender, nondistended   Pelvic:  Exam chaperoned by female assistant.   External: normal female genitalia, no masses or lesions   Vagina: moist, pink mucosa with rugae, physiologic discharge  Cervix: no masses or lesions, nontender  Uterus: approx 9 weeks, mobile, midline, nontender  Adnexa: no masses or fullness, nontender  Rectovaginal: deferred  Extremities: No redness or tenderness  Skin: Well perfused, normal coloration and turgor, no lesions or rashes visualized  Neuro: Alert, oriented, normal speech, no focal deficits, moves extremities appropriately  Psych: Normal mood and behavior.    Labs:  Procedure visit on 2024   Component Date Value    Case Report 2024                      Value:Surgical Pathology                                Case: " [id number]                                   Authorizing Provider:  Aleks Quiroz MD         Collected:           11/06/2024 04:34 PM          Ordering Location:     [facility] Received:            11/07/2024 08:30 AM                                 - Obstetrics And                                                                                    Gynecology                                                                   Pathologist:           Boogie Ocampo MD                                                           Specimen:    Endometrium                                                                                Final Diagnosis 11/06/2024                      Value:A. Endometrium, biopsy:  Benign proliferative endometrium, blood, inflamed mucous, and benign endocervix/ endocervical epithelium      Gross Description 11/06/2024                      Value:A. Endometrium:   The specimen is received in formalin designated endometrium and consists of multiple shaggy red-brown tissue fragments and clear mucoid material that measures 2.0 x 3.0 cm in aggregate and is entirely submitted cassette A1.    Grossing was completed by Mac Sousa.      Microscopic Description 11/06/2024                      Value:A microscopic examination was performed and the diagnosis reflects the findings.          Clinical Information 11/06/2024                      Value:Menorrhagia and irregular cycle and uterine leiomyoma    Laboratory Notes 11/06/2024                      Value:If this report includes immunohistochemical (IHC) test results, please note the following: IHC studies were interpreted in conjunction with appropriate positive and negative controls which demonstrate the expected positive and negative reactivity. This laboratory is regulated under CLIA as qualified to perform high-complexity testing. IHC tests are used for clinical purposes. They should not be regarded as investigational or  research.        POC Preg Test, Ur 11/06/2024 Negative      Acceptab* 11/06/2024 Yes        Aleks Quiroz MD

## 2024-12-05 ENCOUNTER — PATIENT MESSAGE (OUTPATIENT)
Dept: OBSTETRICS AND GYNECOLOGY | Facility: CLINIC | Age: 39
End: 2024-12-05

## 2024-12-05 VITALS
WEIGHT: 152 LBS | OXYGEN SATURATION: 96 % | HEIGHT: 66 IN | SYSTOLIC BLOOD PRESSURE: 110 MMHG | DIASTOLIC BLOOD PRESSURE: 71 MMHG | HEART RATE: 64 BPM | TEMPERATURE: 98 F | BODY MASS INDEX: 24.43 KG/M2 | RESPIRATION RATE: 20 BRPM

## 2024-12-05 PROBLEM — G89.29 CHRONIC RIGHT LOWER QUADRANT PAIN: Status: ACTIVE | Noted: 2024-12-05

## 2024-12-05 PROBLEM — R10.31 CHRONIC RIGHT LOWER QUADRANT PAIN: Status: ACTIVE | Noted: 2024-12-05

## 2024-12-05 LAB
BASOPHILS # BLD AUTO: 0.02 K/UL (ref 0–0.2)
BASOPHILS NFR BLD AUTO: 0.2 % (ref 0–1)
DIFFERENTIAL METHOD BLD: ABNORMAL
EOSINOPHIL # BLD AUTO: 0 K/UL (ref 0–0.5)
EOSINOPHIL NFR BLD AUTO: 0 % (ref 1–4)
ERYTHROCYTE [DISTWIDTH] IN BLOOD BY AUTOMATED COUNT: 13.2 % (ref 11.5–14.5)
HCT VFR BLD AUTO: 32.3 % (ref 38–47)
HGB BLD-MCNC: 9.8 G/DL (ref 12–16)
IMM GRANULOCYTES # BLD AUTO: 0.03 K/UL (ref 0–0.04)
IMM GRANULOCYTES NFR BLD: 0.3 % (ref 0–0.4)
LYMPHOCYTES # BLD AUTO: 1.52 K/UL (ref 1–4.8)
LYMPHOCYTES NFR BLD AUTO: 15.6 % (ref 27–41)
MCH RBC QN AUTO: 28.5 PG (ref 27–31)
MCHC RBC AUTO-ENTMCNC: 30.3 G/DL (ref 32–36)
MCV RBC AUTO: 93.9 FL (ref 80–96)
MONOCYTES # BLD AUTO: 0.72 K/UL (ref 0–0.8)
MONOCYTES NFR BLD AUTO: 7.4 % (ref 2–6)
MPC BLD CALC-MCNC: 11.6 FL (ref 9.4–12.4)
NEUTROPHILS # BLD AUTO: 7.47 K/UL (ref 1.8–7.7)
NEUTROPHILS NFR BLD AUTO: 76.5 % (ref 53–65)
NRBC # BLD AUTO: 0 X10E3/UL
NRBC, AUTO (.00): 0 %
PLATELET # BLD AUTO: 262 K/UL (ref 150–400)
RBC # BLD AUTO: 3.44 M/UL (ref 4.2–5.4)
WBC # BLD AUTO: 9.76 K/UL (ref 4.5–11)

## 2024-12-05 PROCEDURE — 94761 N-INVAS EAR/PLS OXIMETRY MLT: CPT

## 2024-12-05 PROCEDURE — 25000003 PHARM REV CODE 250: Performed by: OBSTETRICS & GYNECOLOGY

## 2024-12-05 PROCEDURE — 63600175 PHARM REV CODE 636 W HCPCS: Performed by: OBSTETRICS & GYNECOLOGY

## 2024-12-05 PROCEDURE — 36415 COLL VENOUS BLD VENIPUNCTURE: CPT | Performed by: OBSTETRICS & GYNECOLOGY

## 2024-12-05 PROCEDURE — 58571 TLH W/T/O 250 G OR LESS: CPT | Mod: ,,, | Performed by: OBSTETRICS & GYNECOLOGY

## 2024-12-05 PROCEDURE — 85025 COMPLETE CBC W/AUTO DIFF WBC: CPT | Performed by: OBSTETRICS & GYNECOLOGY

## 2024-12-05 PROCEDURE — 99900035 HC TECH TIME PER 15 MIN (STAT)

## 2024-12-05 RX ORDER — ONDANSETRON 8 MG/1
8 TABLET, ORALLY DISINTEGRATING ORAL EVERY 8 HOURS PRN
Qty: 30 TABLET | Refills: 1 | Status: SHIPPED | OUTPATIENT
Start: 2024-12-05

## 2024-12-05 RX ORDER — IBUPROFEN 800 MG/1
800 TABLET ORAL EVERY 8 HOURS
Qty: 30 TABLET | Refills: 1 | Status: SHIPPED | OUTPATIENT
Start: 2024-12-05

## 2024-12-05 RX ORDER — OXYCODONE AND ACETAMINOPHEN 10; 325 MG/1; MG/1
1 TABLET ORAL EVERY 4 HOURS PRN
Qty: 30 TABLET | Refills: 0 | Status: SHIPPED | OUTPATIENT
Start: 2024-12-05

## 2024-12-05 RX ADMIN — SIMETHICONE 80 MG: 80 TABLET, CHEWABLE ORAL at 07:12

## 2024-12-05 RX ADMIN — CITALOPRAM HYDROBROMIDE 40 MG: 20 TABLET ORAL at 08:12

## 2024-12-05 RX ADMIN — DIPHENHYDRAMINE HYDROCHLORIDE 25 MG: 25 CAPSULE ORAL at 12:12

## 2024-12-05 RX ADMIN — ACETAMINOPHEN 650 MG: 325 TABLET ORAL at 03:12

## 2024-12-05 RX ADMIN — MUPIROCIN 1 G: 20 OINTMENT TOPICAL at 08:12

## 2024-12-05 RX ADMIN — OXYCODONE HYDROCHLORIDE AND ACETAMINOPHEN 1 TABLET: 5; 325 TABLET ORAL at 04:12

## 2024-12-05 RX ADMIN — KETOROLAC TROMETHAMINE 30 MG: 30 INJECTION, SOLUTION INTRAMUSCULAR; INTRAVENOUS at 07:12

## 2024-12-05 RX ADMIN — BUPROPION HYDROCHLORIDE 150 MG: 150 TABLET, EXTENDED RELEASE ORAL at 08:12

## 2024-12-05 RX ADMIN — IBUPROFEN 800 MG: 400 TABLET, FILM COATED ORAL at 02:12

## 2024-12-05 RX ADMIN — OXYCODONE AND ACETAMINOPHEN 1 TABLET: 10; 325 TABLET ORAL at 07:12

## 2024-12-05 NOTE — PROGRESS NOTES
[facility]  Wound Care    Patient Name:  Elisabeth Ford   MRN:  [id number]  Date: 12/5/2024  Diagnosis: Menorrhagia with irregular cycle    History:     History reviewed. No pertinent past medical history.    Social History     Socioeconomic History    Marital status:    Tobacco Use    Smoking status: Some Days     Types: Vaping with nicotine    Smokeless tobacco: Never   Substance and Sexual Activity    Alcohol use: Not Currently    Drug use: Never    Sexual activity: Yes     Social Drivers of Health     Financial Resource Strain: Low Risk  (12/5/2024)    Overall Financial Resource Strain (CARDIA)     Difficulty of Paying Living Expenses: Not hard at all   Food Insecurity: No Food Insecurity (12/5/2024)    Hunger Vital Sign     Worried About Running Out of Food in the Last Year: Never true     Ran Out of Food in the Last Year: Never true   Transportation Needs: No Transportation Needs (12/5/2024)    TRANSPORTATION NEEDS     Transportation : No   Physical Activity: Insufficiently Active (12/5/2024)    Exercise Vital Sign     Days of Exercise per Week: 5 days     Minutes of Exercise per Session: 20 min   Stress: No Stress Concern Present (12/5/2024)    [employer] [place] [employer] - Occupational Stress Questionnaire     Feeling of Stress : Not at all   Housing Stability: Low Risk  (12/5/2024)    Housing Stability Vital Sign     Unable to Pay for Housing in the Last Year: No     Homeless in the Last Year: No       Precautions:     Allergies as of 10/23/2024 - Reviewed 10/23/2024   Allergen Reaction Noted    Phenergan [promethazine] Swelling 10/14/2022       [facility] Assessment Details/Treatment     Narrative: Seen patient for initial preventative skin care measures.  Patient ambulates.  No foam borders, redness, or open wounds noted to bilateral heels and sacral.  Consult wound care of any findings.      12/05/2024

## 2024-12-05 NOTE — OP NOTE
[facility]  Surgery Department  Operative Note    SUMMARY     Date of Procedure: 12/4/2024     Procedure: Procedure(s) (LRB):  XI ROBOTIC HYSTERECTOMY,WITH SALPINGECTOMY (Bilateral)  CYSTOSCOPY (N/A)  XI ROBOTIC LYSIS, ADHESIONS  XI ROBOTIC LYSIS, ADHESIONS     Robotic lysis of adhesions    Surgeons and Role:  Panel 1:     * Aleks Quiroz MD - Primary  Panel 2:     * Zoran Mendoza MD - Primary    Assisting Surgeon: None    Pre-Operative Diagnosis: Dysmenorrhea [N94.6]  Menorrhagia with irregular cycle [N92.1]  Dyspareunia due to medical condition in female [N94.19]  Chronic right lower quadrant pain [R10.31, G89.29]  Pelvic pain [R10.2]    Post-Operative Diagnosis: Post-Op Diagnosis Codes:     * Dysmenorrhea [N94.6]     * Menorrhagia with irregular cycle [N92.1]     * Dyspareunia due to medical condition in female [N94.19]     * Chronic right lower quadrant pain [R10.31, G89.29]     * Pelvic pain [R10.2]     * Female pelvic peritoneal adhesions [N73.6]    Anesthesia: General    Procedures Performed:  Laparoscopic lysis of adhesions    Significant Findings of the Procedure:  Some adhesions of the bowel including the rectosigmoid area to the left adnexal region.  Right area of the cecum was attached to the sidewall near the right adnexal structures    Procedure in Detail:  After intraoperative consult was placed robot was already docked.  We then looked into the patient's pelvis area and saw some sigmoid colon attached of the left adnexal region and well as the right cecal area attached near the anterior abdominal wall near the right ovary.  Using the vessel sealer we carefully cauterize the adhesions and dissected out and mobilize it off the structures.  Once adequate mobilization was performed Dr. Quiroz continued with her portion of the case.  She tolerated my portion well    Complications: No    Estimated Blood Loss (EBL): 20 mL           Implants: * No implants in log *    Specimens:    Specimen (24h ago, onward)      None                    Condition: Good    Disposition: PACU - hemodynamically stable.    Attestation: I was present and scrubbed for the entire procedure.

## 2024-12-05 NOTE — DISCHARGE SUMMARY
[facility]  Discharge Note  Short Stay    Procedure(s) (LRB):  XI ROBOTIC HYSTERECTOMY,WITH SALPINGECTOMY (Bilateral)  CYSTOSCOPY (N/A)  XI ROBOTIC LYSIS, ADHESIONS  XI ROBOTIC LYSIS, ADHESIONS      OUTCOME: Patient tolerated treatment/procedure well without complication and is now ready for discharge.    DISPOSITION: Home or Self Care    FINAL DIAGNOSIS:  Menorrhagia with irregular cycle    FOLLOWUP: In clinic    DISCHARGE INSTRUCTIONS:    Discharge Procedure Orders   Diet Adult Regular     Pelvic Rest   Order Comments: X 9 weeks     Notify your health care provider if you experience any of the following:  temperature >100.4     Notify your health care provider if you experience any of the following:  persistent nausea and vomiting or diarrhea     Notify your health care provider if you experience any of the following:  severe uncontrolled pain     Notify your health care provider if you experience any of the following:  difficulty breathing or increased cough     Notify your health care provider if you experience any of the following:  severe persistent headache     Notify your health care provider if you experience any of the following:  worsening rash     Notify your health care provider if you experience any of the following:  persistent dizziness, light-headedness, or visual disturbances     Notify your health care provider if you experience any of the following:  increased confusion or weakness     Notify your health care provider if you experience any of the following:   Order Comments: Vaginal bleeding > 1 pad per hour, foul smelling vaginal discharge, purulent incisional drainage, or any other acute changes.     No driving until:   Order Comments: Off Percocet and no pain when pressing the brake.     Lifting restrictions   Order Comments: No heavy lifting > 10 lbs x 2 weeks.     Other restrictions (specify):   Order Comments: Only showers. No submersion in water. No baths. X 6  weeks.  Take Miralax daily until regular Bms.  Take Simethicone prn gas.     Activity as tolerated         Clinical Reference Documents Added to Patient Instructions         Document    DILATION AND CURETTAGE DISCHARGE INSTRUCTIONS (ENGLISH)            TIME SPENT ON DISCHARGE: 20 minutes

## 2024-12-05 NOTE — PROGRESS NOTES
"Postoperative Note    Assessment/Plan:  39 y.o. with Dysmenorrhea [N94.6]  Menorrhagia with irregular cycle [N92.1]  Dyspareunia due to medical condition in female [N94.19]  Chronic right lower quadrant pain [R10.31, G89.29]  Pelvic pain [R10.2] s/p XI ROBOTIC HYSTERECTOMY,WITH SALPINGECTOMY, CYSTOSCOPY, XI ROBOTIC LYSIS, ADHESIONS, 1 Day Post-Op:    [unfilled]    Will give Toradol and Percocet now.   Will re-assess pain in a few hours.  If pain is under control, then plan to discharge at lunch time.    CC: No chief complaint on file.      Subjective:  S/p XI ROBOTIC HYSTERECTOMY,WITH SALPINGECTOMY, CYSTOSCOPY, XI ROBOTIC LYSIS, ADHESIONS, 1 Day Post-Op    Pt seen and examined.  Doing ok. Bladder functioning well. Only spotting noted. She states that she does have increased pain currently. She denies any n/v. She did tolerate a regular diet overnight. She states that her pain is not well controlled and it hurts when she moves and she has shoulder pain. She had Simethicone overnight.  She did not received her scheduled dose of Toradol at 0600 b/c she was asleep.   She is having a HA that was mildly improved with Tylenol.    Review of Systems - The following ROS was otherwise negative, except as noted in the HPI:  constitutional, respiratory, cardiovascular, gastrointestinal, genitourinary    Objective:  /65   Pulse 72   Temp 98.1 °F (36.7 °C) (Oral)   Resp 20   Ht 5' 6" (1.676 m)   Wt 68.9 kg (152 lb)   LMP 11/02/2024   SpO2 96%   Breastfeeding No   BMI 24.53 kg/m²   General:  Alert, well appearing, mild distress due to pain.  Abdomen:  Soft, appropriately tender to palpation, slightly distended.  Incision:  Well healed, appears c/d/i, no significant drainage or erythema x 4. Demabond intact.  Extremities:  No redness or tenderness in LE, neg Temitope's sign    Path:   Specimens (From admission, onward)       Start     Ordered    12/04/24 0925  Surgical Pathology  RELEASE UPON ORDERING         " 12/04/24 0925

## 2024-12-05 NOTE — PROGRESS NOTES
Stopped by to check on the patient this afternoon. She is doing better. Her pain is better controlled, but present.   She is ambulating in the room.   She is ready to be discharged home.  F/u in 1 week for postop visit.

## 2024-12-05 NOTE — NURSING
Patient awake, alert and oriented x4. Discharge summary reviewed. Patient denies any further education and/or questions at this time. IV removed with catheter intact. Hemostasis obtained. Tolerated removal well. Discharged via wheelchair.

## 2024-12-05 NOTE — PLAN OF CARE
[facility]  Initial Discharge Assessment       Primary Care Provider: No, Primary Doctor    Admission Diagnosis: Dysmenorrhea [N94.6]  Menorrhagia with irregular cycle [N92.1]  Dyspareunia due to medical condition in female [N94.19]  Chronic right lower quadrant pain [R10.31, G89.29]  Pelvic pain [R10.2]  Menometrorrhagia [N92.1]    Admission Date: 12/4/2024  Expected Discharge Date:     Transition of Care Barriers: None    Payor: /     Extended Emergency Contact Information  Primary Emergency Contact: Julio César Ford  Address: [address] [place] [address] [facility]  Mobile Phone: [phone]  Relation: Other   needed? No    Discharge Plan A: Home with family  Discharge Plan B: Home with family, Home Health, Long-term acute care facility (LTAC), Rehab, Skilled Nursing Facility      [facility] [address] [facility] [facility]  [address] [facility] [address]  Phone: [phone] Fax: [phone]      Initial Assessment (most recent)       Adult Discharge Assessment - 12/05/24 1110          Discharge Assessment    Assessment Type Discharge Planning Assessment     Confirmed/corrected address, phone number and insurance Yes     Confirmed Demographics Correct on Facesheet     Source of Information patient     People in Home spouse     Do you expect to return to your current living situation? Yes     Do you have help at home or someone to help you manage your care at home? Yes     Who are your caregiver(s) and their phone number(s)? Julio Césarlala Ford,, [phone]     Prior to hospitilization cognitive status: Unable to Assess     Current cognitive status: Alert/Oriented     Walking or Climbing Stairs Difficulty no     Dressing/Bathing Difficulty no     Home Accessibility wheelchair accessible     Home Layout Able to live on 1st floor     Equipment Currently Used at Home none     Readmission within 30 days? No     Patient currently  being followed by outpatient case management? No     Do you currently have service(s) that help you manage your care at home? No     Do you take prescription medications? Yes     Do you have prescription coverage? No   applied for MCDMS    Do you have any problems affording any of your prescribed medications? TBD     Is the patient taking medications as prescribed? yes     Who is going to help you get home at discharge? Julio César Ford,, [phone]     How do you get to doctors appointments? car, drives self;family or friend will provide     Are you on dialysis? No     Do you take coumadin? No     Discharge Plan A Home with family     Discharge Plan B Home with family;Home Health;Long-term acute care facility (LTAC);Rehab;Skilled Nursing Facility     DME Needed Upon Discharge  none     Discharge Plan discussed with: Patient     Transition of Care Barriers None        Physical Activity    On average, how many days per week do you engage in moderate to strenuous exercise (like a brisk walk)? 5 days     On average, how many minutes do you engage in exercise at this level? 20 min        Financial Resource Strain    How hard is it for you to pay for the very basics like food, housing, medical care, and heating? Not hard at all        Housing Stability    In the last 12 months, was there a time when you were not able to pay the mortgage or rent on time? No     At any time in the past 12 months, were you homeless or living in a shelter (including now)? No        Transportation Needs    Has the lack of transportation kept you from medical appointments, meetings, work or from getting things needed for daily living? No        Food Insecurity    Within the past 12 months, you worried that your food would run out before you got the money to buy more. Never true     Within the past 12 months, the food you bought just didn't last and you didn't have money to get more. Never true        Stress    Do you feel stress - tense,  restless, nervous, or anxious, or unable to sleep at night because your mind is troubled all the time - these days? Not at all        Social Isolation    How often do you feel lonely or isolated from those around you?  Never        Alcohol Use    Q1: How often do you have a drink containing alcohol? Never     Q2: How many drinks containing alcohol do you have on a typical day when you are drinking? Patient does not drink     Q3: How often do you have six or more drinks on one occasion? Never        Utilities    In the past 12 months has the electric, gas, oil, or water company threatened to shut off services in your home? No        Health Literacy    How often do you need to have someone help you when you read instructions, pamphlets, or other written material from your doctor or pharmacy? Rarely        OTHER    Name(s) of People in Home Julio César Ford,, [phone]                   SS spoke with pt at bedside. Pt lives at home with her  and plans to return when medically ready for dc. Pt does not currently require dme and is not current with hh. SDOH complete. SS following

## 2024-12-06 LAB
ESTROGEN SERPL-MCNC: NORMAL PG/ML
INSULIN SERPL-ACNC: NORMAL U[IU]/ML
LAB AP GROSS DESCRIPTION: NORMAL
LAB AP LABORATORY NOTES: NORMAL
T3RU NFR SERPL: NORMAL %

## 2024-12-06 NOTE — PLAN OF CARE
[facility]  Discharge Final Note    Primary Care Provider: No, Primary Doctor    Expected Discharge Date: 12/5/2024    Final Discharge Note (most recent)       Final Note - 12/06/24 0827          Final Note    Assessment Type Final Discharge Note     Anticipated Discharge Disposition Home or Self Care     What phone number can be called within the next 1-3 days to see how you are doing after discharge? [phone]        Post-Acute Status    Discharge Delays None known at this time                     Contact Info       Aleks Quiroz MD   Specialty: Obstetrics and Gynecology    [address] [facility] [address]   Phone: [phone]       Next Steps: Follow up in 1 week(s)    Instructions: 1 week hosp follow up: 12- at 1:30pm          Pt dc home with family. No further needs noted

## 2024-12-12 ENCOUNTER — OFFICE VISIT (OUTPATIENT)
Dept: OBSTETRICS AND GYNECOLOGY | Facility: CLINIC | Age: 39
End: 2024-12-12

## 2024-12-12 VITALS
HEIGHT: 66 IN | SYSTOLIC BLOOD PRESSURE: 121 MMHG | WEIGHT: 144 LBS | BODY MASS INDEX: 23.14 KG/M2 | HEART RATE: 92 BPM | DIASTOLIC BLOOD PRESSURE: 78 MMHG

## 2024-12-12 DIAGNOSIS — Z90.710 STATUS POST HYSTERECTOMY: ICD-10-CM

## 2024-12-12 DIAGNOSIS — Z48.89 ENCOUNTER FOR POSTOPERATIVE CARE: Primary | ICD-10-CM

## 2024-12-12 PROCEDURE — 99024 POSTOP FOLLOW-UP VISIT: CPT | Mod: ,,, | Performed by: OBSTETRICS & GYNECOLOGY

## 2024-12-12 PROCEDURE — 99999 PR PBB SHADOW E&M-EST. PATIENT-LVL III: CPT | Mod: PBBFAC,,, | Performed by: OBSTETRICS & GYNECOLOGY

## 2024-12-12 PROCEDURE — 99213 OFFICE O/P EST LOW 20 MIN: CPT | Mod: PBBFAC | Performed by: OBSTETRICS & GYNECOLOGY

## 2024-12-12 NOTE — PROGRESS NOTES
"Return Gyn Office Visit    Assessment/Plan  Problem List Items Addressed This Visit    None  Visit Diagnoses         Encounter for postoperative care    -  Primary      Status post hysterectomy              She is doing well postoperatively.  RTC in 5 weeks for 6 week postop visit    CC:   Chief Complaint   Patient presents with    Pre-op Exam     Pt c/o pain on right side and stitches.      HPI:  40 y.o. who presents to office for 1 week status post robotic hysterectomy with bilateral salpingectomy, cystoscopy and lysis of adhesions.    She is doing well. She is having some pain, but this is improving with time. Denies any vaginal discharge or vaginal bleeding. She is having some spotting.    Reviewed her pathology was benign:    A. Uterus and left uterine tube, hysterectomy and unilateral salpingectomy:  - Benign uterine cervix  - Benign uterine corpus with superficial adenomyosis and serosal adhesions  - Benign uterine tube       Review of Systems - The following ROS was otherwise negative, except as noted in the HPI:  constitutional, respiratory, cardiovascular, gastrointestinal, genitourinary    Objective:  /78   Pulse 92   Ht 5' 6" (1.676 m)   Wt 65.3 kg (144 lb)   LMP 11/02/2024 (Exact Date)   BMI 23.24 kg/m²   General: Alert, well appearing, no acute distress  Abdomen: Soft, nontender, nondistended.   Inc: c/d/I x 4. Dermabond removed from incisions. Vertical mattress suture removed with forceps and iris scissors. Pain improved after this.   Psych: Normal mood and behavior.       Aleks Quiroz MD     "

## (undated) DEVICE — SUT V-LOC GRN 30CM 12IN 2-0

## (undated) DEVICE — GLOVE SENSICARE PI GRN 7

## (undated) DEVICE — FORCEP FENESTRATED BIPOLAR

## (undated) DEVICE — SEALER LIGASURE MARYLAND 37CM

## (undated) DEVICE — SEAL UNIVERSAL 5MM-8MM XI

## (undated) DEVICE — GLOVE SENSICARE PI SURG 7.5

## (undated) DEVICE — SEALER VESSEL EXTEND

## (undated) DEVICE — SODIUM CHLORIDE 0.9% 1000ML

## (undated) DEVICE — SET PNEUMOCLEAR HEAT HUM SE HF

## (undated) DEVICE — SUT VICRYL PLUS 1 CTX 36IN

## (undated) DEVICE — OCCLUDER COLPO-PNEUMO STERILE

## (undated) DEVICE — OBTURATOR BLADELESS 8MM XI CLR

## (undated) DEVICE — SCISSOR HOT SHEARS XI

## (undated) DEVICE — WARMER BLUE HEAT SCOPE 3-12MM

## (undated) DEVICE — GOWN POLY REINF BRTH SLV XL

## (undated) DEVICE — DRIVER NEEDLE SUTURECUT MEGA

## (undated) DEVICE — SUT MONOCYRL 4-0 PS2 UND

## (undated) DEVICE — MATRIX HEMSTAT FLOSEAL 5ML

## (undated) DEVICE — SYR 10CC LUER LOCK

## (undated) DEVICE — MANIPULATOR UTERINE 3.5CM

## (undated) DEVICE — APPLICATOR FLOSEAL ENDO 35CM

## (undated) DEVICE — TROCAR ENDO Z THREAD KII 5X100

## (undated) DEVICE — TAPE DRAPE STRIP CLSR 4.5X24IN

## (undated) DEVICE — KIT ROBOTIC RUSH

## (undated) DEVICE — GLOVE SENSICARE PI GRN 6.5

## (undated) DEVICE — COVER TIP CURVED SCISSORS XI

## (undated) DEVICE — GLOVE SENSICARE PI SURG 6.5

## (undated) DEVICE — DRAPE ARM DAVINCI XI

## (undated) DEVICE — SYR 50CC LL